# Patient Record
Sex: FEMALE | Race: WHITE | NOT HISPANIC OR LATINO | Employment: FULL TIME | URBAN - METROPOLITAN AREA
[De-identification: names, ages, dates, MRNs, and addresses within clinical notes are randomized per-mention and may not be internally consistent; named-entity substitution may affect disease eponyms.]

---
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
## 2024-01-18 ENCOUNTER — TELEPHONE (OUTPATIENT)
Dept: URGENT CARE | Facility: CLINIC | Age: 19
End: 2024-01-18

## 2024-01-18 ENCOUNTER — OFFICE VISIT (OUTPATIENT)
Dept: URGENT CARE | Facility: CLINIC | Age: 19
End: 2024-01-18
Payer: COMMERCIAL

## 2024-01-18 VITALS
SYSTOLIC BLOOD PRESSURE: 105 MMHG | OXYGEN SATURATION: 98 % | DIASTOLIC BLOOD PRESSURE: 71 MMHG | HEART RATE: 89 BPM | WEIGHT: 140.75 LBS | TEMPERATURE: 99 F | BODY MASS INDEX: 24.94 KG/M2 | RESPIRATION RATE: 18 BRPM | HEIGHT: 63 IN

## 2024-01-18 DIAGNOSIS — Z20.2 ENCOUNTER FOR ASSESSMENT OF STD EXPOSURE: Primary | ICD-10-CM

## 2024-01-18 PROCEDURE — 87491 CHLMYD TRACH DNA AMP PROBE: CPT | Performed by: NURSE PRACTITIONER

## 2024-01-18 PROCEDURE — 99213 OFFICE O/P EST LOW 20 MIN: CPT | Mod: S$GLB,,, | Performed by: NURSE PRACTITIONER

## 2024-01-18 NOTE — PROGRESS NOTES
"Subjective:      Patient ID: Swathi Rachel is a 18 y.o. female.    Vitals:  height is 5' 3" (1.6 m) and weight is 63.9 kg (140 lb 12.2 oz). Her oral temperature is 99 °F (37.2 °C). Her blood pressure is 105/71 and her pulse is 89. Her respiration is 18 and oxygen saturation is 98%.     Chief Complaint: Exposure to STD    Pt presents she would like to retested for chlamydia. No symptoms.     19 yo woman, treated for CT in 11/30/24.  She finished Doxycycline.  Denies any UPI since she was last tested.  Denies any vaginal or urinary symptoms.     Exposure to STD   This is a new problem. The current episode started today. Risk factors include history of STDs.     ROS   Objective:     Physical Exam   Constitutional: She is oriented to person, place, and time. She appears well-developed.   HENT:   Head: Normocephalic and atraumatic. Head is without abrasion, without contusion and without laceration.   Ears:   Right Ear: External ear normal.   Left Ear: External ear normal.   Nose: Nose normal.   Mouth/Throat: Oropharynx is clear and moist and mucous membranes are normal.   Eyes: Conjunctivae, EOM and lids are normal. Pupils are equal, round, and reactive to light.   Neck: Trachea normal and phonation normal. Neck supple.   Cardiovascular: Normal rate, regular rhythm and normal heart sounds.   Pulmonary/Chest: Effort normal and breath sounds normal. No stridor. No respiratory distress.   Musculoskeletal: Normal range of motion.         General: Normal range of motion.   Neurological: She is alert and oriented to person, place, and time.   Skin: Skin is warm, dry, intact and no rash. No abrasion, No burn, No bruising and No ecchymosis   Psychiatric: Her speech is normal and behavior is normal. Judgment and thought content normal.   Nursing note and vitals reviewed.      Assessment:     1. Encounter for assessment of STD exposure        Plan:   Follow labs.  Will call patient when results are finalized.  Strict condom use " advised.  Recommend smoking cessation.  Return to clinica as needed.   Encounter for assessment of STD exposure  -     C. trachomatis/N. gonorrhoeae by AMP DNA [facility]; Vagina

## 2024-01-18 NOTE — PATIENT INSTRUCTIONS
STD Screening     You were tested for sexual transmitted diseases today, we will call you in 3-7 days with the results of the testing. If you need more medication when the labs result come in, we will call you and phone them in for you.  PLEASE SET UP YOUR [facility] ACCOUNT SO THAT YOU CAN RECEIVE YOUR LAB RESULTS ONCE THEY RETURN.  Strict condom use advised.  Please remain abstinent until further notice.         IF POSITIVE:    Notify sexual partners of the need for testing.    NO sexual intercourse until given all lab results and appropriate treatment. Avoid sexual intercourse for 7 days until you and your partner have been treated.     Complete ALL medications prescribed (if required).  Please supplement with OTC probiotics and yogurt if prescribed antibiotics. Take probiotics or eat yogurt 4 hours after you take antibiotics.    Re-test to ensure infection has cleared-there are infections that require more agressive treatment.  Retest for all in 6 weeks (if still have symptoms) and again in 3-6 months to ensure true negative results.  You may be instructed to re-test for cure sooner than 6 weeks.     Today's testing will give no crediable information if you have unprotected sexual activities going forward.      Today's testing will give no crediable information if you have unprotected sexual activities going forward.      REMEMBER WEAR CONDOMS AND GET TESTED OFTEN.             Discussed diagnosis with patient as well as treatment and home care. Discussed return to clinic precautions vs ER precautions. All patients questions answered. Patient verbalized understanding. Patient agreed with plan of care.     You must understand that you have received an Urgent Care treatment only and that you may be released before all of your medical problems are known or treated.

## 2024-01-21 LAB
C TRACH DNA SPEC QL NAA+PROBE: NOT DETECTED
N GONORRHOEA DNA SPEC QL NAA+PROBE: NOT DETECTED

## 2024-01-22 ENCOUNTER — TELEPHONE (OUTPATIENT)
Dept: URGENT CARE | Facility: CLINIC | Age: 19
End: 2024-01-22
Payer: COMMERCIAL

## 2024-03-02 ENCOUNTER — OFFICE VISIT (OUTPATIENT)
Dept: URGENT CARE | Facility: CLINIC | Age: 19
End: 2024-03-02
Payer: COMMERCIAL

## 2024-03-02 VITALS
DIASTOLIC BLOOD PRESSURE: 60 MMHG | BODY MASS INDEX: 23.56 KG/M2 | RESPIRATION RATE: 16 BRPM | WEIGHT: 138 LBS | SYSTOLIC BLOOD PRESSURE: 100 MMHG | HEART RATE: 89 BPM | HEIGHT: 64 IN | TEMPERATURE: 98 F | OXYGEN SATURATION: 100 %

## 2024-03-02 DIAGNOSIS — R05.9 COUGH, UNSPECIFIED TYPE: Primary | ICD-10-CM

## 2024-03-02 DIAGNOSIS — J02.0 STREP PHARYNGITIS: ICD-10-CM

## 2024-03-02 DIAGNOSIS — R09.81 SINUS CONGESTION: ICD-10-CM

## 2024-03-02 DIAGNOSIS — J02.9 SORE THROAT: ICD-10-CM

## 2024-03-02 LAB
CTP QC/QA: YES
MOLECULAR STREP A: POSITIVE
POC MOLECULAR INFLUENZA A AGN: NEGATIVE
POC MOLECULAR INFLUENZA B AGN: NEGATIVE
SARS-COV-2 RDRP RESP QL NAA+PROBE: NEGATIVE

## 2024-03-02 PROCEDURE — 87651 STREP A DNA AMP PROBE: CPT | Mod: QW,S$GLB,, | Performed by: FAMILY MEDICINE

## 2024-03-02 PROCEDURE — 87635 SARS-COV-2 COVID-19 AMP PRB: CPT | Mod: QW,S$GLB,, | Performed by: FAMILY MEDICINE

## 2024-03-02 PROCEDURE — 87502 INFLUENZA DNA AMP PROBE: CPT | Mod: QW,S$GLB,, | Performed by: FAMILY MEDICINE

## 2024-03-02 PROCEDURE — 99214 OFFICE O/P EST MOD 30 MIN: CPT | Mod: S$GLB,,, | Performed by: FAMILY MEDICINE

## 2024-03-02 RX ORDER — PENICILLIN V POTASSIUM 500 MG/1
500 TABLET, FILM COATED ORAL 2 TIMES DAILY
Qty: 20 TABLET | Refills: 0 | Status: SHIPPED | OUTPATIENT
Start: 2024-03-02 | End: 2024-03-05 | Stop reason: ALTCHOICE

## 2024-03-02 RX ORDER — PENICILLIN V POTASSIUM 500 MG/1
500 TABLET, FILM COATED ORAL 2 TIMES DAILY
Qty: 20 TABLET | Refills: 0 | Status: SHIPPED | OUTPATIENT
Start: 2024-03-02 | End: 2024-03-02

## 2024-03-02 NOTE — PROGRESS NOTES
"Subjective:      Patient ID: Swathi Rachel is a 18 y.o. female.    Vitals:  height is 5' 4" (1.626 m) and weight is 62.6 kg (138 lb). Her oral temperature is 97.5 °F (36.4 °C). Her blood pressure is 100/60 and her pulse is 89. Her respiration is 16 and oxygen saturation is 100%.     Chief Complaint: Sinus Problem    Patient presents with c/o cough, sore throat, running nose, sinus congestion. body aches and subjective fever for 3 days. Pt denies CP, SOB, weakness/dizziness, N/V, diarrhea, abdominal pain, dysuria, loss of smell or taste.              Sinus Problem  This is a new problem. Episode onset: 3 days. The problem is unchanged. There has been no fever. Associated symptoms include congestion, coughing and a sore throat. Treatments tried: sudafed.       HENT:  Positive for congestion and sore throat.    Respiratory:  Positive for cough.       Objective:     Physical Exam   Constitutional: She is oriented to person, place, and time. She appears well-developed. She is cooperative.  Non-toxic appearance. She does not appear ill. No distress.   HENT:   Head: Normocephalic and atraumatic.   Ears:   Right Ear: Hearing, tympanic membrane, external ear and ear canal normal. impacted cerumen  Left Ear: Hearing, tympanic membrane, external ear and ear canal normal. impacted cerumen  Nose: Congestion present. No mucosal edema, rhinorrhea or nasal deformity. No epistaxis. Right sinus exhibits no maxillary sinus tenderness and no frontal sinus tenderness. Left sinus exhibits no maxillary sinus tenderness and no frontal sinus tenderness.   Mouth/Throat: Uvula is midline, oropharynx is clear and moist and mucous membranes are normal. No trismus in the jaw. Normal dentition. No uvula swelling. No oropharyngeal exudate or posterior oropharyngeal edema.      Comments: +ve pharyngeal erythema with mild bilateral tonsillar swelling without exudates.        Eyes: Conjunctivae and lids are normal. No scleral icterus.   Neck: " Trachea normal and phonation normal. Neck supple. No edema present. No erythema present. No neck rigidity present.   Cardiovascular: Normal rate, regular rhythm, normal heart sounds and normal pulses.   No murmur heard.  Pulmonary/Chest: Effort normal and breath sounds normal. No stridor. No respiratory distress. She has no decreased breath sounds. She has no wheezes. She has no rhonchi. She has no rales.   Abdominal: Normal appearance. She exhibits no distension. There is no abdominal tenderness. There is no left CVA tenderness and no right CVA tenderness.   Musculoskeletal: Normal range of motion.         General: No deformity. Normal range of motion.      Cervical back: She exhibits no tenderness.   Lymphadenopathy:     She has no cervical adenopathy.   Neurological: She is alert, oriented to person, place, and time and at baseline. She exhibits normal muscle tone. Coordination normal.   Skin: Skin is warm, dry, intact, not diaphoretic and not pale.   Psychiatric: Her speech is normal and behavior is normal. Judgment and thought content normal.   Nursing note and vitals reviewed.      Assessment:     1. Cough, unspecified type    2. Sinus congestion    3. Sore throat    4. Strep pharyngitis        Plan:   Discussed exam findings/results/diagnosis/plan with patient in clinic.  Throat precautions advised.  Advised to f/u with PCP within 2-5 days. ER precautions given if symptoms get any worse. All questions answered. Patient verbally understood and agreed with treatment plan.     Cough, unspecified type  -     POCT COVID-19 Rapid Screening    Sinus congestion  -     POCT Influenza A/B MOLECULAR    Sore throat  -     POCT Strep A, Molecular    Strep pharyngitis    Other orders  -     Discontinue: penicillin v potassium (VEETID) 500 MG tablet; Take 1 tablet (500 mg total) by mouth 2 (two) times a day. for 10 days  Dispense: 20 tablet; Refill: 0  -     penicillin v potassium (VEETID) 500 MG tablet; Take 1 tablet (500  mg total) by mouth 2 (two) times a day. for 10 days  Dispense: 20 tablet; Refill: 0

## 2024-03-05 ENCOUNTER — OFFICE VISIT (OUTPATIENT)
Dept: URGENT CARE | Facility: CLINIC | Age: 19
End: 2024-03-05
Payer: COMMERCIAL

## 2024-03-05 VITALS
WEIGHT: 141.19 LBS | HEIGHT: 64 IN | RESPIRATION RATE: 18 BRPM | HEART RATE: 78 BPM | DIASTOLIC BLOOD PRESSURE: 64 MMHG | OXYGEN SATURATION: 98 % | TEMPERATURE: 99 F | BODY MASS INDEX: 24.1 KG/M2 | SYSTOLIC BLOOD PRESSURE: 106 MMHG

## 2024-03-05 DIAGNOSIS — N89.8 VAGINAL ITCHING: ICD-10-CM

## 2024-03-05 DIAGNOSIS — N76.0 BACTERIAL VAGINOSIS: ICD-10-CM

## 2024-03-05 DIAGNOSIS — N89.8 VAGINAL DISCHARGE: Primary | ICD-10-CM

## 2024-03-05 DIAGNOSIS — Z11.3 SCREENING FOR STD (SEXUALLY TRANSMITTED DISEASE): ICD-10-CM

## 2024-03-05 DIAGNOSIS — B96.89 BACTERIAL VAGINOSIS: ICD-10-CM

## 2024-03-05 PROCEDURE — 99214 OFFICE O/P EST MOD 30 MIN: CPT | Mod: S$GLB,,, | Performed by: NURSE PRACTITIONER

## 2024-03-05 PROCEDURE — 87491 CHLMYD TRACH DNA AMP PROBE: CPT | Performed by: NURSE PRACTITIONER

## 2024-03-05 PROCEDURE — 81514 NFCT DS BV&VAGINITIS DNA ALG: CPT | Performed by: NURSE PRACTITIONER

## 2024-03-05 RX ORDER — FLUCONAZOLE 150 MG/1
150 TABLET ORAL
Qty: 3 TABLET | Refills: 0 | Status: SHIPPED | OUTPATIENT
Start: 2024-03-05 | End: 2024-03-14

## 2024-03-05 RX ORDER — METRONIDAZOLE 500 MG/1
500 TABLET ORAL EVERY 12 HOURS
Qty: 14 TABLET | Refills: 0 | Status: SHIPPED | OUTPATIENT
Start: 2024-03-05 | End: 2024-03-12

## 2024-03-05 NOTE — PROGRESS NOTES
"Subjective:      Patient ID: Swathi Rachel is a 18 y.o. female.    Vitals:  height is 5' 4" (1.626 m) and weight is 64 kg (141 lb 3.3 oz). Her temperature is 99 °F (37.2 °C). Her blood pressure is 106/64 and her pulse is 78. Her respiration is 18 and oxygen saturation is 98%.     Chief Complaint: Vaginal Discharge    Pt presents a vaginal itching, discharge and slight odor that started about 4 days ago. Pt states she would like vaginal testing.    17 yo woman, presents for STD testing.  Reports vaginal discharge, odor and itching x 4 days.  She tested positive for CT in 11/23; checked again in January, this was negative.  Denies UPI in the last 3 months.  Denies any fevers, chills, irregular bleeding, abdominal pain  or urinary issues.  She is sexually active with men.  She is using a Liletta  IUD for birth control.      Vaginal Discharge  The patient's primary symptoms include genital itching, a genital odor and vaginal discharge. This is a new problem. The current episode started in the past 7 days. The problem occurs constantly. The problem has been unchanged. The pain is mild. The vaginal discharge was thick and white. There has been no bleeding. She has not been passing clots. She has not been passing tissue. She has tried nothing for the symptoms. She is sexually active. No, her partner does not have an STD. She uses an IUD for contraception. Her menstrual history has been irregular. Her past medical history is significant for an STD and vaginosis.       Genitourinary:  Positive for vaginal discharge.      Objective:     Physical Exam   Constitutional: She is oriented to person, place, and time. She appears well-developed.   HENT:   Head: Normocephalic and atraumatic.   Ears:   Right Ear: External ear normal.   Left Ear: External ear normal.   Nose: Nose normal. No nasal deformity. No epistaxis.   Mouth/Throat: Oropharynx is clear and moist and mucous membranes are normal.   Eyes: Lids are normal.   Neck: " Trachea normal and phonation normal. Neck supple.   Cardiovascular: Normal pulses.   Pulmonary/Chest: Effort normal.   Abdominal: Normal appearance and bowel sounds are normal. She exhibits no distension. Soft. There is no abdominal tenderness. There is no rebound, no guarding, no left CVA tenderness and no right CVA tenderness.   Genitourinary:    Vulva and right adnexa normal.   Rectum:      No external hemorrhoid.   No vaginal rash and no pubic lice.    Pelvic exam was performed with patient in the lithotomy position.   There is no rash, tenderness, lesion or injury on the right labia. There is no rash, tenderness, lesion or injury on the left labia. Cervix exhibits no motion tenderness, no lesion, no discharge and no friability.    Vaginal discharge (milky white/yellow discharge in the vault) and tenderness (in the vault) present.      No vaginal erythema or bleeding.   There is tenderness (in the vault) in the vagina. No erythema or bleeding in the vagina.    No foreign body in the vagina.      No signs of injury or lesions in the vagina.   Urethra has/is not prolapse, no urethral pain, no urethral swelling and no urethral lesion.   Lymphadenopathy: No inguinal adenopathy noted on the right or left side.   Neurological: She is alert and oriented to person, place, and time.   Skin: Skin is warm, dry and intact.   Psychiatric: Her speech is normal and behavior is normal.   Nursing note and vitals reviewed.      Assessment:     1. Vaginal discharge    2. Vaginal itching    3. Screening for STD (sexually transmitted disease)    4. Bacterial vaginosis        Plan:   Start presumptive treatment for Flagyl and Diflucan.  Follow labs will contact patient with lab results in 3-5 days.  Strict condom use advised.  Return to clinic as needed.     Vaginal discharge  -     C. trachomatis/N. gonorrhoeae by AMP DNA [facility]; Vagina  -     Vaginosis Screen by DNA Probe  -     metroNIDAZOLE (FLAGYL) 500 MG tablet; Take 1 tablet  (500 mg total) by mouth every 12 (twelve) hours. for 7 days  Dispense: 14 tablet; Refill: 0  -     fluconazole (DIFLUCAN) 150 MG Tab; Take 1 tablet (150 mg total) by mouth Every 3 (three) days. for 9 days  Dispense: 3 tablet; Refill: 0    Vaginal itching  -     C. trachomatis/N. gonorrhoeae by AMP [facility]; Vagina  -     Vaginosis Screen by DNA Probe  -     metroNIDAZOLE (FLAGYL) 500 MG tablet; Take 1 tablet (500 mg total) by mouth every 12 (twelve) hours. for 7 days  Dispense: 14 tablet; Refill: 0  -     fluconazole (DIFLUCAN) 150 MG Tab; Take 1 tablet (150 mg total) by mouth Every 3 (three) days. for 9 days  Dispense: 3 tablet; Refill: 0    Screening for STD (sexually transmitted disease)  -     C. trachomatis/N. gonorrhoeae by AMP [facility]; Vagina  -     Vaginosis Screen by DNA Probe    Bacterial vaginosis  -     metroNIDAZOLE (FLAGYL) 500 MG tablet; Take 1 tablet (500 mg total) by mouth every 12 (twelve) hours. for 7 days  Dispense: 14 tablet; Refill: 0

## 2024-03-05 NOTE — PATIENT INSTRUCTIONS
Start Metronidazole twice a day with food today.  Start Diflucan as needed for itching.  We will call you with your test results in 3-5 days.  Return to clinic as needed.     Bacterial Vaginosis    Start taking Flagyl (Metronidazole) today.  Finish the prescription unless instructed otherwise.  Take Flagyl with apple juice to prevent a metallic taste in your mouth and take it with food.    A culture of your vaginal elel was sent to the lab. You will be contacted once it results in about 3-5 days and appropriate action will be taken.        To prevent Bacterial Vaginosis infections:  - Wear breathable cotton underwear  - Keep external genitalia clean and dry  - Change out of wet bathing suits/athletic clothing as soon as possible  - Do not use douches, perfumed soap, creams, bubble baths or perfumed feminine hygiene products  - Use Dove Sensitive Skin soap or just warm water without any soap to wash your vaginal area  - Use use unscented detergents and dryer sheets to wash your clothes  - Use unscented tampons and pads during your menstrual cycles  - Avoid sexual activity while symptomatic  - Try taking an over the counter probiotic daily or eating yogurt.     Get tested for Sexually Transmitted Infections if you have had unprotected sex.    Please return or see your primary care doctor if you develop new or worsening symptoms and have not had the screening.        Please arrange follow up with your primary medical clinic as soon as possible. You must understand that you've received an Urgent Care treatment only and that you may be released before all of your medical problems are known or treated. You, the patient, will arrange for follow up as instructed. If your symptoms worsen or fail to improve you should go to the Emergency Room.    STD Screening     You were tested for sexual transmitted diseases today, we will call you in 3-7 days with the results of the testing. If you need more medication when the labs  result come in, we will call you and phone them in for you.  PLEASE SET UP YOUR [employer] ACCOUNT SO THAT YOU CAN RECEIVE YOUR LAB RESULTS ONCE THEY RETURN.  Strict condom use advised.  Please remain abstinent until further notice.         IF POSITIVE:    Notify sexual partners of the need for testing.    NO sexual intercourse until given all lab results and appropriate treatment. Avoid sexual intercourse for 7 days until you and your partner have been treated.     Complete ALL medications prescribed (if required).  Please supplement with OTC probiotics and yogurt if prescribed antibiotics. Take probiotics or eat yogurt 4 hours after you take antibiotics.    Re-test to ensure infection has cleared-there are infections that require more agressive treatment.  Retest for all in 6 weeks (if still have symptoms) and again in 3-6 months to ensure true negative results.  You may be instructed to re-test for cure sooner than 6 weeks.     Today's testing will give no crediable information if you have unprotected sexual activities going forward.      Today's testing will give no crediable information if you have unprotected sexual activities going forward.      REMEMBER WEAR CONDOMS AND GET TESTED OFTEN.             Discussed diagnosis with patient as well as treatment and home care. Discussed return to clinic precautions vs ER precautions. All patients questions answered. Patient verbalized understanding. Patient agreed with plan of care.     You must understand that you have received an Urgent Care treatment only and that you may be released before all of your medical problems are known or treated.

## 2024-03-06 DIAGNOSIS — B37.9 YEAST INFECTION: Primary | ICD-10-CM

## 2024-03-06 LAB
BACTERIAL VAGINOSIS DNA: NEGATIVE
C TRACH DNA SPEC QL NAA+PROBE: NOT DETECTED
CANDIDA GLABRATA DNA: NEGATIVE
CANDIDA KRUSEI DNA: NEGATIVE
CANDIDA RRNA VAG QL PROBE: POSITIVE
N GONORRHOEA DNA SPEC QL NAA+PROBE: NOT DETECTED
T VAGINALIS RRNA GENITAL QL PROBE: NEGATIVE

## 2024-03-06 RX ORDER — FLUCONAZOLE 150 MG/1
150 TABLET ORAL DAILY
Qty: 1 TABLET | Refills: 0 | Status: SHIPPED | OUTPATIENT
Start: 2024-03-06 | End: 2024-03-07

## 2024-03-07 ENCOUNTER — TELEPHONE (OUTPATIENT)
Dept: URGENT CARE | Facility: CLINIC | Age: 19
End: 2024-03-07
Payer: COMMERCIAL

## 2024-03-07 NOTE — TELEPHONE ENCOUNTER
Called patient regarding lab results.  She is negative for GC/CT.  Vaginosis screen positive for yeast.  Stop Flagyl.  Start Diflucan.  This was already written and another Rx was written yesterday.  Take every 3 days x 1-2 times.  Return to clinic as needed.  Strict condom use advised.

## 2024-04-09 ENCOUNTER — OFFICE VISIT (OUTPATIENT)
Dept: URGENT CARE | Facility: CLINIC | Age: 19
End: 2024-04-09
Payer: COMMERCIAL

## 2024-04-09 VITALS
OXYGEN SATURATION: 98 % | DIASTOLIC BLOOD PRESSURE: 62 MMHG | BODY MASS INDEX: 24.56 KG/M2 | WEIGHT: 143.88 LBS | SYSTOLIC BLOOD PRESSURE: 101 MMHG | HEIGHT: 64 IN | RESPIRATION RATE: 18 BRPM | HEART RATE: 71 BPM | TEMPERATURE: 98 F

## 2024-04-09 DIAGNOSIS — Z11.3 SCREENING EXAMINATION FOR VENEREAL DISEASE: Primary | ICD-10-CM

## 2024-04-09 DIAGNOSIS — N89.8 VAGINAL ODOR: ICD-10-CM

## 2024-04-09 PROCEDURE — 87491 CHLMYD TRACH DNA AMP PROBE: CPT | Performed by: NURSE PRACTITIONER

## 2024-04-09 PROCEDURE — 81514 NFCT DS BV&VAGINITIS DNA ALG: CPT | Performed by: NURSE PRACTITIONER

## 2024-04-09 PROCEDURE — 99213 OFFICE O/P EST LOW 20 MIN: CPT | Mod: S$GLB,,, | Performed by: NURSE PRACTITIONER

## 2024-04-09 NOTE — PROGRESS NOTES
"Subjective:      Patient ID: Swathi Rachel is a 18 y.o. female.    Vitals:  height is 5' 4" (1.626 m) and weight is 65.2 kg (143 lb 13.6 oz). Her oral temperature is 98.1 °F (36.7 °C). Her blood pressure is 101/62 and her pulse is 71. Her respiration is 18 and oxygen saturation is 98%.     Chief Complaint: Vaginal Discharge    Pt presents vaginal discharge and odor that started 4 days ago. Last intercourse was 3 weeks ago.    Vaginal Discharge  The patient's primary symptoms include a genital odor and vaginal discharge. This is a new problem. The current episode started in the past 7 days. The problem occurs constantly. The problem has been unchanged. The patient is experiencing no pain. She is not pregnant. The vaginal discharge was thick and thin (light brown/pink). She has not been passing clots. She has not been passing tissue. She is sexually active. No, her partner does not have an STD. She uses an IUD for contraception. Her menstrual history has been irregular. Her past medical history is significant for an STD and vaginosis.       Genitourinary:  Positive for vaginal discharge.      Objective:     Physical Exam   Constitutional: She is oriented to person, place, and time.  Non-toxic appearance. She does not appear ill. No distress.   HENT:   Head: Normocephalic and atraumatic.   Nose: No congestion.   Eyes: Conjunctivae are normal. Pupils are equal, round, and reactive to light. Extraocular movement intact   Cardiovascular: Normal rate, regular rhythm, normal heart sounds and normal pulses.   Pulmonary/Chest: Effort normal and breath sounds normal. No respiratory distress. She has no wheezes.   Abdominal: Normal appearance. There is no abdominal tenderness.   Musculoskeletal:      Right lower leg: No edema.      Left lower leg: No edema.   Neurological: no focal deficit. She is alert and oriented to person, place, and time.   Skin: Skin is not diaphoretic.   Psychiatric: Her behavior is normal. Mood normal. "   Nursing note and vitals reviewed.    Pt refused vaginal exam today.  Assessment:     1. Screening examination for venereal disease    2. Vaginal odor        Plan:       Screening examination for venereal disease  -     C. trachomatis/N. gonorrhoeae by AMP [facility]; Vagina    Vaginal odor  -     C. trachomatis/N. gonorrhoeae by AMP [facility]; Vagina  -     VAGINOSIS SCREEN BY DNA PROBE      Pt wants to wait for results before treatment.  Will call her when the results are available.

## 2024-04-10 LAB
BACTERIAL VAGINOSIS DNA: POSITIVE
CANDIDA GLABRATA DNA: NEGATIVE
CANDIDA KRUSEI DNA: NEGATIVE
CANDIDA RRNA VAG QL PROBE: NEGATIVE
T VAGINALIS RRNA GENITAL QL PROBE: NEGATIVE

## 2024-04-11 ENCOUNTER — TELEPHONE (OUTPATIENT)
Dept: URGENT CARE | Facility: CLINIC | Age: 19
End: 2024-04-11
Payer: COMMERCIAL

## 2024-04-11 LAB
C TRACH DNA SPEC QL NAA+PROBE: NOT DETECTED
N GONORRHOEA DNA SPEC QL NAA+PROBE: NOT DETECTED

## 2024-04-11 NOTE — TELEPHONE ENCOUNTER
Informed her that her BV is positive but we are still waiting on her GC/CT.  She has a full bottle of Metronidazole at home and will start taking that today.  I will call her back when the rest of her results are available.  Pt verbalized understanding.

## 2024-08-26 ENCOUNTER — OFFICE VISIT (OUTPATIENT)
Dept: URGENT CARE | Facility: CLINIC | Age: 19
End: 2024-08-26
Payer: COMMERCIAL

## 2024-08-26 VITALS
SYSTOLIC BLOOD PRESSURE: 101 MMHG | DIASTOLIC BLOOD PRESSURE: 63 MMHG | TEMPERATURE: 99 F | HEIGHT: 64 IN | OXYGEN SATURATION: 99 % | WEIGHT: 138.69 LBS | RESPIRATION RATE: 17 BRPM | BODY MASS INDEX: 23.68 KG/M2 | HEART RATE: 66 BPM

## 2024-08-26 DIAGNOSIS — N76.0 BV (BACTERIAL VAGINOSIS): ICD-10-CM

## 2024-08-26 DIAGNOSIS — N89.8 VAGINAL DISCHARGE: Primary | ICD-10-CM

## 2024-08-26 DIAGNOSIS — B96.89 BV (BACTERIAL VAGINOSIS): ICD-10-CM

## 2024-08-26 DIAGNOSIS — B37.9 YEAST INFECTION: ICD-10-CM

## 2024-08-26 DIAGNOSIS — Z11.3 SCREENING FOR STD (SEXUALLY TRANSMITTED DISEASE): ICD-10-CM

## 2024-08-26 PROCEDURE — 81514 NFCT DS BV&VAGINITIS DNA ALG: CPT | Performed by: INTERNAL MEDICINE

## 2024-08-26 PROCEDURE — 87491 CHLMYD TRACH DNA AMP PROBE: CPT | Performed by: INTERNAL MEDICINE

## 2024-08-26 PROCEDURE — 87591 N.GONORRHOEAE DNA AMP PROB: CPT | Performed by: INTERNAL MEDICINE

## 2024-08-26 PROCEDURE — 99214 OFFICE O/P EST MOD 30 MIN: CPT | Mod: S$GLB,,, | Performed by: INTERNAL MEDICINE

## 2024-08-27 LAB
BACTERIAL VAGINOSIS DNA: POSITIVE
C TRACH DNA SPEC QL NAA+PROBE: NOT DETECTED
CANDIDA GLABRATA DNA: NEGATIVE
CANDIDA KRUSEI DNA: NEGATIVE
CANDIDA RRNA VAG QL PROBE: POSITIVE
N GONORRHOEA DNA SPEC QL NAA+PROBE: NOT DETECTED
T VAGINALIS RRNA GENITAL QL PROBE: NEGATIVE

## 2024-08-28 VITALS
DIASTOLIC BLOOD PRESSURE: 63 MMHG | RESPIRATION RATE: 16 BRPM | OXYGEN SATURATION: 99 % | HEART RATE: 66 BPM | SYSTOLIC BLOOD PRESSURE: 101 MMHG | TEMPERATURE: 99 F

## 2024-08-28 RX ORDER — METRONIDAZOLE 500 MG/1
500 TABLET ORAL EVERY 12 HOURS
Qty: 14 TABLET | Refills: 0 | Status: SHIPPED | OUTPATIENT
Start: 2024-08-28 | End: 2024-09-04

## 2024-08-28 NOTE — PROGRESS NOTES
Subjective:      Patient ID: Swathi Rachel is a 19 y.o. female.    Vitals:  temperature is 99.2 °F (37.3 °C). Her blood pressure is 101/63 and her pulse is 66. Her respiration is 16 and oxygen saturation is 99%.     Chief Complaint: Vaginal Discharge    20 yo here for vaginal discharge. White, clumpy, odorless.       ROS   Objective:     Physical Exam   Constitutional: She is oriented to person, place, and time.  Non-toxic appearance. She does not appear ill. No distress.   HENT:   Head: Normocephalic and atraumatic.   Ears:   Right Ear: External ear normal.   Left Ear: External ear normal.   Nose: Nose normal. No congestion.   Pulmonary/Chest: No respiratory distress.   Abdominal: Normal appearance.   Neurological: She is alert and oriented to person, place, and time.   Skin: Skin is warm, dry and not diaphoretic.   Psychiatric: Her behavior is normal. Mood, judgment and thought content normal.   Vitals reviewed.      Assessment:     1. Vaginal discharge    2. Yeast infection    3. Screening for STD (sexually transmitted disease)        Plan:       Vaginal discharge    Yeast infection  -     Vaginosis Screen by DNA Probe; Future; Expected date: 08/26/2024    Screening for STD (sexually transmitted disease)  -     Vaginosis Screen by DNA Probe; Future; Expected date: 08/26/2024  -     C. trachomatis/N. gonorrhoeae by AMP [facility]; Vagina  -     Cancel: HIV 1/2 Ag/Ab (4th Gen)  -     Cancel: Treponema Pallidium Antibodies IgG, IgM  -     Cancel: HEPATITIS C ANTIBODY      Fluconazole sent in via rx pad

## 2024-10-21 ENCOUNTER — OFFICE VISIT (OUTPATIENT)
Dept: URGENT CARE | Facility: CLINIC | Age: 19
End: 2024-10-21
Payer: COMMERCIAL

## 2024-10-21 VITALS
OXYGEN SATURATION: 97 % | HEART RATE: 66 BPM | RESPIRATION RATE: 18 BRPM | SYSTOLIC BLOOD PRESSURE: 98 MMHG | BODY MASS INDEX: 24.57 KG/M2 | WEIGHT: 143.94 LBS | DIASTOLIC BLOOD PRESSURE: 60 MMHG | HEIGHT: 64 IN | TEMPERATURE: 98 F

## 2024-10-21 DIAGNOSIS — N89.8 VAGINAL DISCHARGE: Primary | ICD-10-CM

## 2024-10-21 DIAGNOSIS — N89.8 VAGINAL ODOR: ICD-10-CM

## 2024-10-21 PROCEDURE — 0352U VAGINOSIS SCREEN BY DNA PROBE: CPT | Performed by: NURSE PRACTITIONER

## 2024-10-21 PROCEDURE — 99213 OFFICE O/P EST LOW 20 MIN: CPT | Mod: S$GLB,,, | Performed by: NURSE PRACTITIONER

## 2024-10-21 PROCEDURE — 87491 CHLMYD TRACH DNA AMP PROBE: CPT | Performed by: NURSE PRACTITIONER

## 2024-10-21 NOTE — PATIENT INSTRUCTIONS
STD Screening     IF YOU ARE TESTING TODAY WITH A CONCERN FOR A POTENTIAL EXPOSURE TO AN STD AFTER UNPROTECTED SEXUAL INTERCOURSE, IT IS RECOMMENDED TO TEST TODAY AND AGAIN IN 4-6 WEEKS AFTER THE POTENTIAL EXPOSURE TO ENSURE TRUE NEGATIVE RESULTS.       REMAIN ABSTINENT UNTIL AFTER YOUR TESTS HAVE RESULTED    We will call you in 3-7 days with the results of the testing. If you need more medication when the labs result come in, we will call you and phone them in for you.  PLEASE SET UP YOUR [employer] ACCOUNT SO THAT YOU CAN RECEIVE YOUR LAB RESULTS ONCE THEY RETURN.        IF POSITIVE:     IF YOU ARE POSITIVE FOR AN STD, IT IS RECOMMENDED THAT YOU TEST AGAIN IN 3-6 MONTHS AFTER COMPLETION OF THERAPY TO ENSURE YOU REMAIN NEGATIVE.  CERTAIN POSITIVE TESTS MAY REQUIRE SOONER RE-TESTING AND WE WILL GUIDE YOU IN THESE SITUATIONS.  Notify sexual partners of the need for testing.    NO sexual intercourse until given all lab results and appropriate treatment. Avoid sexual intercourse for 7 days until you and your partner have been treated.  Complete ALL medications prescribed (if required).  Please supplement with OTC probiotics and yogurt if prescribed antibiotics. Take probiotics or eat yogurt 4 hours after you take antibiotics.     Today's testing will give no crediable information if you have unprotected sexual activities going forward.         REMEMBER WEAR CONDOMS AND GET TESTED OFTEN.

## 2024-10-22 ENCOUNTER — TELEPHONE (OUTPATIENT)
Dept: URGENT CARE | Facility: CLINIC | Age: 19
End: 2024-10-22
Payer: COMMERCIAL

## 2024-10-22 ENCOUNTER — PATIENT MESSAGE (OUTPATIENT)
Dept: URGENT CARE | Facility: CLINIC | Age: 19
End: 2024-10-22
Payer: COMMERCIAL

## 2024-10-22 ENCOUNTER — ON-DEMAND VIRTUAL (OUTPATIENT)
Dept: URGENT CARE | Facility: CLINIC | Age: 19
End: 2024-10-22
Payer: COMMERCIAL

## 2024-10-22 DIAGNOSIS — N76.0 BACTERIAL VAGINOSIS: Primary | ICD-10-CM

## 2024-10-22 DIAGNOSIS — B37.9 YEAST INFECTION: ICD-10-CM

## 2024-10-22 DIAGNOSIS — R14.0 ABDOMINAL BLOATING: Primary | ICD-10-CM

## 2024-10-22 DIAGNOSIS — B96.89 BACTERIAL VAGINOSIS: Primary | ICD-10-CM

## 2024-10-22 LAB
BACTERIAL VAGINOSIS DNA: DETECTED
C TRACH DNA SPEC QL NAA+PROBE: NOT DETECTED
CANDIDA GLABRATA/KRUSEI: NOT DETECTED
CANDIDA RRNA VAG QL PROBE: DETECTED
N GONORRHOEA DNA SPEC QL NAA+PROBE: NOT DETECTED
TRICHOMONAS VAGINALIS: NOT DETECTED

## 2024-10-22 PROCEDURE — 99213 OFFICE O/P EST LOW 20 MIN: CPT | Mod: 95,,, | Performed by: NURSE PRACTITIONER

## 2024-10-22 RX ORDER — FLUCONAZOLE 150 MG/1
150 TABLET ORAL DAILY
Qty: 2 TABLET | Refills: 0 | Status: SHIPPED | OUTPATIENT
Start: 2024-10-22 | End: 2024-10-24

## 2024-10-22 RX ORDER — DICYCLOMINE HYDROCHLORIDE 10 MG/1
10 CAPSULE ORAL
Qty: 120 CAPSULE | Refills: 0 | Status: SHIPPED | OUTPATIENT
Start: 2024-10-22 | End: 2024-11-21

## 2024-10-22 RX ORDER — METRONIDAZOLE 500 MG/1
500 TABLET ORAL EVERY 12 HOURS
Qty: 14 TABLET | Refills: 0 | Status: SHIPPED | OUTPATIENT
Start: 2024-10-22 | End: 2024-10-29

## 2024-10-22 RX ORDER — FLUCONAZOLE 150 MG/1
150 TABLET ORAL DAILY
Qty: 2 TABLET | Refills: 0 | Status: SHIPPED | OUTPATIENT
Start: 2024-10-22 | End: 2024-10-22 | Stop reason: SDUPTHER

## 2024-10-22 RX ORDER — METRONIDAZOLE 500 MG/1
500 TABLET ORAL EVERY 12 HOURS
Qty: 14 TABLET | Refills: 0 | Status: SHIPPED | OUTPATIENT
Start: 2024-10-22 | End: 2024-10-22 | Stop reason: SDUPTHER

## 2024-10-22 NOTE — PROGRESS NOTES
Subjective:      Patient ID: Swathi Rachel is a 19 y.o. female.    Vitals:  vitals were not taken for this visit.     Chief Complaint: Bloated      Visit Type: TELE AUDIOVISUAL - This visit was conducted virtually based on  subjective information and limited objective exam    Present with the patient at the time of consultation: TELEMED PRESENT WITH PATIENT: None  Two patient identifiers used to verify patient- saying out date of birth and full name.       Past Medical History:   Diagnosis Date    HSV-1 (herpes simplex virus 1) infection      History reviewed. No pertinent surgical history.  Review of patient's allergies indicates:  No Known Allergies  Current Outpatient Medications on File Prior to Visit   Medication Sig Dispense Refill    fluconazole (DIFLUCAN) 150 MG Tab Take 1 tablet (150 mg total) by mouth once daily. Take 1 dose now and repeat in 3 days. for 2 doses 2 tablet 0    metroNIDAZOLE (FLAGYL) 500 MG tablet Take 1 tablet (500 mg total) by mouth every 12 (twelve) hours. for 7 days 14 tablet 0    valACYclovir (VALTREX) 500 MG tablet Take 500 mg by mouth.      [DISCONTINUED] fluconazole (DIFLUCAN) 150 MG Tab Take 1 tablet (150 mg total) by mouth once daily. Take 1 dose now and repeat in 3 days. for 2 doses 2 tablet 0    [DISCONTINUED] metroNIDAZOLE (FLAGYL) 500 MG tablet Take 1 tablet (500 mg total) by mouth every 12 (twelve) hours. for 7 days 14 tablet 0     No current facility-administered medications on file prior to visit.     Family History   Problem Relation Name Age of Onset    Breast cancer Mother      No Known Problems Father         Medications Ordered                [id number] - [place] - [address]   [address] [place] [address]    Telephone: [phone]   Fax: [phone]   Hours: Not open 24 hours                         E-Prescribed (1 of 1)              dicyclomine (BENTYL) 10 MG capsule    Sig: Take 1 capsule (10 mg total) by mouth 4 (four)  times daily before meals and nightly.       Start: 10/22/24     Quantity: 120 capsule Refills: 0                           Ohs Peq Odvv Intake    10/22/2024  6:30 PM CDT - Filed by Patient   What is your current physical address in the event of a medical emergency? [address] [place] [address]   Are you able to take your vital signs? No   Please attach any relevant images or files    Is your employer contracted with [facility]? No         19 female with c/o bloating, groin pain, constipation and fatigue. She states worsening over the past month. She states no nausea and vomiting. She has tried laxative , tea, fiber, and water. She states she does not get any relief from constipation and indigestion. She states she is sleeping fine. She states she has primary care in home town but here for school she denies fever.         Constitution: Positive for fatigue.   HENT: Negative.     Cardiovascular: Negative.    Respiratory: Negative.     Gastrointestinal:  Positive for abdominal bloating and constipation.   Endocrine: negative.   Genitourinary:  Positive for vaginal pain. Negative for frequency and urgency.   Musculoskeletal: Negative.    Skin: Negative.    Allergic/Immunologic: Negative.    Neurological: Negative.    Hematologic/Lymphatic: Negative.    Psychiatric/Behavioral: Negative.          Objective:   The physical exam was conducted virtually.  LOCATION OF PATIENT [place] x 3 ; no acute distress noted; appearance normal; mood and behavior normal; thought process normal  Head- normocephalic  Nose- appears normal, no discharge or erythema  Eyes- pupils appear normal in size, no drainage, no erythema  Ears- normal appearing; no discharge, no erythema  Mouth- appears normal  Oropharynx- no erythema, lesions  Lungs- breathing at a normal rate, no acute distress noted  Heart- no reports of tachycardia, palpitations, chest pain  Abdomen- non distended, non tender reported by  patient  Skin- warm and dry, no erythema or edema noted by patient or visualized  Psych- as above; no si/hi      Assessment:     1. Abdominal bloating        Plan:     Refer to gastroenterologist    Thank you for choosing [facility]!    Our goal in the [facility] is to always provide outstanding medical care. You may receive a survey by mail or e-mail in the next week regarding your experience today. We would greatly appreciate you completing and returning the survey. Your feedback provides us with a way to recognize our staff who provide very good care, and it helps us learn how to improve when your experience was below our aspiration of excellence.         We appreciate you trusting us with your medical care. We hope you feel better soon. We will be happy to take care of you for all of your future medical needs.    You must understand that you've received an Urgent Care treatment only and that you may be released before all your medical problems are known or treated. You, the patient, will arrange for follow up care as instructed.    Follow up with your PCP or specialty clinic as directed in the next 1-2 weeks if not improved or as needed.  You can call [phone] to schedule an appointment with the appropriate provider.    If your condition worsens we recommend that you receive another evaluation in person, with your primary care provider, urgent care or at the emergency room immediately or contact your primary medical clinics after hours call service to discuss your concerns.         Abdominal bloating  -     dicyclomine (BENTYL) 10 MG capsule; Take 1 capsule (10 mg total) by mouth 4 (four) times daily before meals and nightly.  Dispense: 120 capsule; Refill: 0

## 2024-10-23 ENCOUNTER — TELEPHONE (OUTPATIENT)
Dept: ENDOSCOPY | Facility: HOSPITAL | Age: 19
End: 2024-10-23
Payer: COMMERCIAL

## 2024-10-23 ENCOUNTER — LAB VISIT (OUTPATIENT)
Dept: LAB | Facility: HOSPITAL | Age: 19
End: 2024-10-23
Attending: INTERNAL MEDICINE
Payer: COMMERCIAL

## 2024-10-23 ENCOUNTER — OFFICE VISIT (OUTPATIENT)
Dept: GASTROENTEROLOGY | Facility: CLINIC | Age: 19
End: 2024-10-23
Payer: COMMERCIAL

## 2024-10-23 VITALS
SYSTOLIC BLOOD PRESSURE: 92 MMHG | DIASTOLIC BLOOD PRESSURE: 59 MMHG | BODY MASS INDEX: 23.98 KG/M2 | HEIGHT: 64 IN | HEART RATE: 82 BPM | WEIGHT: 140.44 LBS

## 2024-10-23 DIAGNOSIS — R14.0 BLOATING: ICD-10-CM

## 2024-10-23 DIAGNOSIS — Z12.11 SPECIAL SCREENING FOR MALIGNANT NEOPLASMS, COLON: Primary | ICD-10-CM

## 2024-10-23 DIAGNOSIS — R19.4 CHANGE IN BOWEL HABITS: ICD-10-CM

## 2024-10-23 DIAGNOSIS — R14.0 BLOATING: Primary | ICD-10-CM

## 2024-10-23 LAB
ALBUMIN SERPL BCP-MCNC: 4.3 G/DL (ref 3.5–5.2)
ALP SERPL-CCNC: 71 U/L (ref 40–150)
ALT SERPL W/O P-5'-P-CCNC: 12 U/L (ref 10–44)
ANION GAP SERPL CALC-SCNC: 7 MMOL/L (ref 8–16)
AST SERPL-CCNC: 16 U/L (ref 10–40)
BASOPHILS # BLD AUTO: 0.05 K/UL (ref 0–0.2)
BASOPHILS NFR BLD: 0.8 % (ref 0–1.9)
BILIRUB SERPL-MCNC: 1.1 MG/DL (ref 0.1–1)
BUN SERPL-MCNC: 14 MG/DL (ref 6–20)
CALCIUM SERPL-MCNC: 9.9 MG/DL (ref 8.7–10.5)
CHLORIDE SERPL-SCNC: 108 MMOL/L (ref 95–110)
CO2 SERPL-SCNC: 26 MMOL/L (ref 23–29)
CREAT SERPL-MCNC: 0.9 MG/DL (ref 0.5–1.4)
DIFFERENTIAL METHOD BLD: ABNORMAL
EOSINOPHIL # BLD AUTO: 0.1 K/UL (ref 0–0.5)
EOSINOPHIL NFR BLD: 1.1 % (ref 0–8)
ERYTHROCYTE [DISTWIDTH] IN BLOOD BY AUTOMATED COUNT: 12.6 % (ref 11.5–14.5)
EST. GFR  (NO RACE VARIABLE): >60 ML/MIN/1.73 M^2
FOLATE SERPL-MCNC: 11.8 NG/ML (ref 4–24)
GLUCOSE SERPL-MCNC: 81 MG/DL (ref 70–110)
HCT VFR BLD AUTO: 42.9 % (ref 37–48.5)
HGB BLD-MCNC: 14 G/DL (ref 12–16)
IMM GRANULOCYTES # BLD AUTO: 0.01 K/UL (ref 0–0.04)
IMM GRANULOCYTES NFR BLD AUTO: 0.2 % (ref 0–0.5)
LYMPHOCYTES # BLD AUTO: 1.4 K/UL (ref 1–4.8)
LYMPHOCYTES NFR BLD: 23 % (ref 18–48)
MCH RBC QN AUTO: 30.6 PG (ref 27–31)
MCHC RBC AUTO-ENTMCNC: 32.6 G/DL (ref 32–36)
MCV RBC AUTO: 94 FL (ref 82–98)
MONOCYTES # BLD AUTO: 0.6 K/UL (ref 0.3–1)
MONOCYTES NFR BLD: 9.1 % (ref 4–15)
NEUTROPHILS # BLD AUTO: 4.1 K/UL (ref 1.8–7.7)
NEUTROPHILS NFR BLD: 65.8 % (ref 38–73)
NRBC BLD-RTO: 0 /100 WBC
PLATELET # BLD AUTO: 244 K/UL (ref 150–450)
PMV BLD AUTO: 9 FL (ref 9.2–12.9)
POTASSIUM SERPL-SCNC: 4.3 MMOL/L (ref 3.5–5.1)
PROT SERPL-MCNC: 7.6 G/DL (ref 6–8.4)
RBC # BLD AUTO: 4.57 M/UL (ref 4–5.4)
SODIUM SERPL-SCNC: 141 MMOL/L (ref 136–145)
T3FREE SERPL-MCNC: 2.4 PG/ML (ref 2.3–4.2)
T4 FREE SERPL-MCNC: 1.01 NG/DL (ref 0.71–1.51)
TSH SERPL DL<=0.005 MIU/L-ACNC: 0.78 UIU/ML (ref 0.4–4)
VIT B12 SERPL-MCNC: 906 PG/ML (ref 210–950)
WBC # BLD AUTO: 6.23 K/UL (ref 3.9–12.7)

## 2024-10-23 PROCEDURE — 80053 COMPREHEN METABOLIC PANEL: CPT | Performed by: INTERNAL MEDICINE

## 2024-10-23 PROCEDURE — 84481 FREE ASSAY (FT-3): CPT | Performed by: INTERNAL MEDICINE

## 2024-10-23 PROCEDURE — 1159F MED LIST DOCD IN RCRD: CPT | Mod: CPTII,S$GLB,, | Performed by: INTERNAL MEDICINE

## 2024-10-23 PROCEDURE — 85025 COMPLETE CBC W/AUTO DIFF WBC: CPT | Performed by: INTERNAL MEDICINE

## 2024-10-23 PROCEDURE — 82784 ASSAY IGA/IGD/IGG/IGM EACH: CPT | Performed by: INTERNAL MEDICINE

## 2024-10-23 PROCEDURE — 84443 ASSAY THYROID STIM HORMONE: CPT | Performed by: INTERNAL MEDICINE

## 2024-10-23 PROCEDURE — 99999 PR PBB SHADOW E&M-EST. PATIENT-LVL III: CPT | Mod: PBBFAC,,, | Performed by: INTERNAL MEDICINE

## 2024-10-23 PROCEDURE — 82746 ASSAY OF FOLIC ACID SERUM: CPT | Performed by: INTERNAL MEDICINE

## 2024-10-23 PROCEDURE — 36415 COLL VENOUS BLD VENIPUNCTURE: CPT | Performed by: INTERNAL MEDICINE

## 2024-10-23 PROCEDURE — 99204 OFFICE O/P NEW MOD 45 MIN: CPT | Mod: S$GLB,,, | Performed by: INTERNAL MEDICINE

## 2024-10-23 PROCEDURE — 3008F BODY MASS INDEX DOCD: CPT | Mod: CPTII,S$GLB,, | Performed by: INTERNAL MEDICINE

## 2024-10-23 PROCEDURE — 84439 ASSAY OF FREE THYROXINE: CPT | Performed by: INTERNAL MEDICINE

## 2024-10-23 PROCEDURE — 82607 VITAMIN B-12: CPT | Performed by: INTERNAL MEDICINE

## 2024-10-23 RX ORDER — IBUPROFEN 200 MG
TABLET ORAL
COMMUNITY

## 2024-10-23 NOTE — TELEPHONE ENCOUNTER
"----- Message from Katie sent at 10/23/2024  9:33 AM CDT -----    ----- Message -----  From: Mehdi Eid MD  Sent: 10/23/2024   8:58 AM CDT  To: [facility]    Procedure: EGD/Colon    Diagnosis: bloating, change in bowel habits    Procedure Timin-12 weeks    #If within 4 weeks selected, please scott as high priority#    #If greater than 12 weeks, please select "4-12 weeks" and delay sending until 2 months prior to requested date#     Provider: Myself    Location: No Preference    Additional Scheduling Information: No scheduling concerns    Prep Specifications:N/A    Have you attached a patient to this message: yes  "

## 2024-10-23 NOTE — PROGRESS NOTES
"GENERAL GI PATIENT INTAKE:    COVID symptoms in the last 7 days (runny nose, sore throat, congestion, cough, fever): No  PCP: No, Primary Doctor  If not PCP-  number given to establish [phone]: N/A    ALLERGIES REVIEWED:  Yes    CHIEF COMPLAINT:    Chief Complaint   Patient presents with    Initial Visit    Constipation    Bloated    Fatigue    Gastroesophageal Reflux    Abdominal Pain       VITAL SIGNS:  Ht 5' 4" (1.626 m)   Wt 63.7 kg (140 lb 6.9 oz)   LMP  (LMP Unknown)   BMI 24.11 kg/m²      Change in medical, surgical, family or social history:  reviewed MD      REVIEWED MEDICATION LIST RECONCILED INCLUDING ABOVE MEDS:  Yes     "

## 2024-10-23 NOTE — TELEPHONE ENCOUNTER
"Mehdi Eid MD  [facility]  Procedure: EGD/Colon    Diagnosis: bloating, change in bowel habits    Procedure Timin-12 weeks    *If within 4 weeks selected, please scott as high priority*    *If greater than 12 weeks, please select "4-12 weeks" and delay sending until 2 months prior to requested date*    Provider: Myself    Location: No Preference    Additional Scheduling Information: No scheduling concerns    Prep Specifications:N/A    Have you attached a patient to this message: yes  "

## 2024-10-23 NOTE — PROGRESS NOTES
[facility] Consultation Note    Reason for Consult:  The encounter diagnosis was Bloating.    PCP:   No, Primary Doctor   No address on file    Referring MD:  Self, Aaareferral  No address on file    Initial History of Present Illness (HPI):  This is a 19 y.o. female here for evaluation of constipation, bloating, fatigue    Happens within 15 minutes of eating  Constipation has gotten worse    Diet: Lactose: does eat   Fructose: some broccoli   (apples, cherries, mangoes, watermelon and pears,fruit juices, dried fruits and canned fruits,asparagus, artichoke and sugar snap peas, honey)   Chewing Gum: no   Carbonated beverages: not really    International Travel: no  Celiac: not testing  Antibiotics History: no  Medications (OCP/PPI/Steroids/NSAIDs):  Activity level: very active  Constipation: Yes          ROS:  Constitutional: No fevers, chills, No weight loss, + sluggish  ENT: No allergies  CV: No chest pain  Pulm: No cough, No shortness of breath  Ophtho: No vision changes  GI: see HPI  Derm: No rash  Heme: No lymphadenopathy, No bruising  MSK: No arthritis  : No dysuria, No hematuria  Endo: No hot or cold intolerance  Neuro: No syncope, No seizure  Psych: No anxiety, No depression    Medical History:  has a past medical history of HSV-1 (herpes simplex virus 1) infection.    Surgical History:  has no past surgical history on file.    Family History: family history includes Breast cancer in her mother; No Known Problems in her father..     Social History:  reports that she has been smoking cigarettes and vaping with nicotine. She has never used smokeless tobacco. She reports current alcohol use. She reports that she does not use drugs.    Review of patient's allergies indicates:  No Known Allergies    Medication List with Changes/Refills   Current Medications    DICYCLOMINE (BENTYL) 10 MG CAPSULE    Take 1 capsule (10 mg total) by mouth 4 (four) times daily before meals and nightly.     "FLUCONAZOLE (DIFLUCAN) 150 MG TAB    Take 1 tablet (150 mg total) by mouth once daily. Take 1 dose now and repeat in 3 days. for 2 doses    LACTOBAC NO.41/BIFIDOBACT NO.7 (PROBIOTIC-10 ORAL)    Take by mouth.    METRONIDAZOLE (FLAGYL) 500 MG TABLET    Take 1 tablet (500 mg total) by mouth every 12 (twelve) hours. for 7 days    PROTEIN POWD    Take by mouth.    VALACYCLOVIR (VALTREX) 500 MG TABLET    Take 500 mg by mouth.         Objective Findings:    Vital Signs:  Ht 5' 4" (1.626 m)   Wt 63.7 kg (140 lb 6.9 oz)   LMP  (LMP Unknown)   BMI 24.11 kg/m²   Body mass index is 24.11 kg/m².    Physical Exam:  General Appearance: Well appearing in no acute distress  Head:   Normocephalic, without obvious abnormality  Eyes:    No scleral icterus, EOMI  ENT: Neck supple, Lips, mucosa, and tongue normal; teeth and gums normal  Lungs: CTA bilaterally in anterior and posterior fields, no wheezes, no crackles.  Heart:  Regular rate and rhythm, S1, S2 normal, no murmurs heard  Abdomen: Soft, non tender, non distended with positive bowel sounds in all four quadrants. No hepatosplenomegaly, ascites, or mass  Extremities: 2+ pulses, no clubbing, cyanosis or edema  Skin: No rash  Neurologic: CN II-XII intact      Labs:  No results found for: "WBC", "HGB", "HCT", "PLT", "CHOL", "TRIG", "HDL", "LDLDIRECT", "ALT", "AST", "NA", "K", "CL", "CREATININE", "BUN", "CO2", "TSH", "PSA", "INR", "GLUF", "HGBA1C", "MICROALBUR"    No results found for: "HPYLORINTERP"  No results found for: "HPYLORIANTIG"        Imaging:    Endoscopy:          Assessment:  1. Bloating           Recommendations:  1. Labs and stool samples as below  2. EGD/Colon with SB biopsies and disacch panel  3. SIBO testing if all unrevealing    No follow-ups on file.      Order summary:  Orders Placed This Encounter    Comprehensive Metabolic Panel    CBC Auto Differential    TSH    T3, Free    T4, Free    Celiac Disease Panel    Vitamin B12    Folate    Giardia / " Cryptosporidum, EIA    Pancreatic elastase, fecal         Thank you so much for allowing me to participate in the care of Swathi Eid MD

## 2024-10-23 NOTE — TELEPHONE ENCOUNTER
"----- Message from Katie sent at 10/23/2024  9:33 AM CDT -----    ----- Message -----  From: Mehdi Eid MD  Sent: 10/23/2024   8:58 AM CDT  To: [facility] Patients    Procedure: EGD/Colon    Diagnosis: bloating, change in bowel habits    Procedure Timin-12 weeks    #If within 4 weeks selected, please scott as high priority#    #If greater than 12 weeks, please select "4-12 weeks" and delay sending until 2 months prior to requested date#     Provider: Myself    Location: No Preference    Additional Scheduling Information: No scheduling concerns    Prep Specifications:N/A    Have you attached a patient to this message: yes  "

## 2024-10-23 NOTE — TELEPHONE ENCOUNTER
Referral for procedure from Telephone call - direct access patient      Spoke to patient to schedule procedure(s) Colonoscopy/EGD       Physician to perform procedure(s) Dr. RUDOLPH Nicolas (Dr. Eid with no availability)  Date of Procedure (s) 12/20/24  Arrival Time 9:45 AM  Time of Procedure(s) 10:45 AM   Location of Procedure(s) [facility] [address] [facility]  Type of Rx Prep sent to patient: PEG  Instructions provided to patient via Copy in hand    Patient was informed on the following information and verbalized understanding. Screening questionnaire reviewed with patient and complete. If procedure requires anesthesia, a responsible adult needs to be present to accompany the patient home, patient cannot drive after receiving anesthesia. Appointment details are tentative, especially check-in time. Patient will receive a prep-op call 7 days prior to confirm check-in time for procedure. If applicable the patient should contact their pharmacy to verify Rx for procedure prep is ready for pick-up. Patient was advised to call the scheduling department at [phone] if pharmacy states no Rx is available. Patient was advised to call the endoscopy scheduling department if any questions or concerns arise.      [facility] Scheduling Department

## 2024-10-28 LAB
GLIADIN PEPTIDE IGA SER-ACNC: 1.2 U/ML
GLIADIN PEPTIDE IGG SER-ACNC: 1.4 U/ML
IGA SERPL-MCNC: 266 MG/DL (ref 70–400)
TTG IGA SER-ACNC: 1 U/ML
TTG IGG SER-ACNC: <0.6 U/ML

## 2024-10-29 ENCOUNTER — PATIENT MESSAGE (OUTPATIENT)
Dept: GASTROENTEROLOGY | Facility: CLINIC | Age: 19
End: 2024-10-29
Payer: COMMERCIAL

## 2024-10-30 ENCOUNTER — LAB VISIT (OUTPATIENT)
Dept: LAB | Facility: HOSPITAL | Age: 19
End: 2024-10-30
Attending: INTERNAL MEDICINE
Payer: COMMERCIAL

## 2024-10-30 DIAGNOSIS — R14.0 BLOATING: ICD-10-CM

## 2024-10-30 PROCEDURE — 82653 EL-1 FECAL QUANTITATIVE: CPT | Performed by: INTERNAL MEDICINE

## 2024-11-02 LAB — ELASTASE 1, FECAL: 387 MCG/G

## 2024-11-04 ENCOUNTER — PATIENT MESSAGE (OUTPATIENT)
Dept: GASTROENTEROLOGY | Facility: CLINIC | Age: 19
End: 2024-11-04
Payer: COMMERCIAL

## 2024-11-18 ENCOUNTER — OFFICE VISIT (OUTPATIENT)
Dept: URGENT CARE | Facility: CLINIC | Age: 19
End: 2024-11-18
Payer: COMMERCIAL

## 2024-11-18 VITALS
OXYGEN SATURATION: 98 % | TEMPERATURE: 99 F | RESPIRATION RATE: 19 BRPM | HEIGHT: 64 IN | HEART RATE: 94 BPM | DIASTOLIC BLOOD PRESSURE: 67 MMHG | WEIGHT: 140.88 LBS | SYSTOLIC BLOOD PRESSURE: 100 MMHG | BODY MASS INDEX: 24.05 KG/M2

## 2024-11-18 DIAGNOSIS — N93.9 VAGINAL SPOTTING: Primary | ICD-10-CM

## 2024-11-18 DIAGNOSIS — Z11.3 SCREENING EXAMINATION FOR VENEREAL DISEASE: ICD-10-CM

## 2024-11-18 PROCEDURE — 0352U VAGINOSIS SCREEN BY DNA PROBE: CPT | Performed by: NURSE PRACTITIONER

## 2024-11-18 PROCEDURE — 99213 OFFICE O/P EST LOW 20 MIN: CPT | Mod: S$GLB,,, | Performed by: NURSE PRACTITIONER

## 2024-11-18 PROCEDURE — 87491 CHLMYD TRACH DNA AMP PROBE: CPT | Performed by: NURSE PRACTITIONER

## 2024-11-18 NOTE — PATIENT INSTRUCTIONS
STD Screening     IF YOU ARE TESTING TODAY WITH A CONCERN FOR A POTENTIAL EXPOSURE TO AN STD AFTER UNPROTECTED SEXUAL INTERCOURSE, IT IS RECOMMENDED TO TEST TODAY AND AGAIN IN 4-6 WEEKS AFTER THE POTENTIAL EXPOSURE TO ENSURE TRUE NEGATIVE RESULTS.       REMAIN ABSTINENT UNTIL AFTER YOUR TESTS HAVE RESULTED    We will call you in 3-7 days with the results of the testing. If you need more medication when the labs result come in, we will call you and phone them in for you.  PLEASE SET UP YOUR [employer] ACCOUNT SO THAT YOU CAN RECEIVE YOUR LAB RESULTS ONCE THEY RETURN.        IF POSITIVE:     IF YOU ARE POSITIVE FOR AN STD, IT IS RECOMMENDED THAT YOU TEST AGAIN IN 3-6 MONTHS AFTER COMPLETION OF THERAPY TO ENSURE YOU REMAIN NEGATIVE.  CERTAIN POSITIVE TESTS MAY REQUIRE SOONER RE-TESTING AND WE WILL GUIDE YOU IN THESE SITUATIONS.  Notify sexual partners of the need for testing.    NO sexual intercourse until given all lab results and appropriate treatment. Avoid sexual intercourse for 7 days until you and your partner have been treated.  Complete ALL medications prescribed (if required).  Please supplement with OTC probiotics and yogurt if prescribed antibiotics. Take probiotics or eat yogurt 4 hours after you take antibiotics.     Today's testing will give no crediable information if you have unprotected sexual activities going forward.         REMEMBER WEAR CONDOMS AND GET TESTED OFTEN.

## 2024-11-18 NOTE — PROGRESS NOTES
"Subjective:      Patient ID: Swathi Rachel is a 19 y.o. female.    Vitals:  height is 5' 4" (1.626 m) and weight is 63.9 kg (140 lb 14 oz). Her oral temperature is 99.3 °F (37.4 °C). Her blood pressure is 100/67 and her pulse is 94. Her respiration is 19 and oxygen saturation is 98%.     Chief Complaint: Vaginal Bleeding    This is a 19 y.o. female who presents today with a chief complaint of vaginal bleeding and discharge that started 2 days ago. Last intercourse was 1 week ago.    Vaginal Bleeding  The patient's primary symptoms include a genital odor, vaginal bleeding and vaginal discharge. This is a new problem. The current episode started in the past 7 days. The problem has been unchanged. The patient is experiencing no pain. The vaginal discharge was thick. The vaginal bleeding is lighter than menses. She has tried nothing for the symptoms. She is sexually active. It is unknown whether or not her partner has an STD. She uses an IUD for contraception. Her past medical history is significant for an STD.       Genitourinary:  Positive for vaginal discharge and vaginal bleeding.    Pt states she has an IUD and normally doesn't have cycle.  The last time she was spotting she was positive for chlamydia. She would like to be tested for BV and chlamydia today.    Objective:     Physical Exam   Constitutional: She is oriented to person, place, and time.  Non-toxic appearance. She does not appear ill. No distress.   HENT:   Head: Normocephalic and atraumatic.   Eyes: Conjunctivae are normal. Pupils are equal, round, and reactive to light. Extraocular movement intact   Cardiovascular: Normal rate, regular rhythm, normal heart sounds and normal pulses.   Pulmonary/Chest: Effort normal and breath sounds normal. No respiratory distress. She has no wheezes.   Abdominal: Normal appearance.   Neurological: no focal deficit. She is alert and oriented to person, place, and time.   Skin: Skin is warm, dry and not diaphoretic. "   Psychiatric: Her behavior is normal. Mood normal.   Nursing note and vitals reviewed.    Assessment:     1. Vaginal spotting    2. Screening examination for venereal disease        Plan:       Vaginal spotting  -     C. trachomatis/N. gonorrhoeae by AMP DNA [facility]; Vagina  -     Vaginosis Screen by DNA Probe; Future; Expected date: 11/18/2024    Screening examination for venereal disease  -     C. trachomatis/N. gonorrhoeae by AMP [facility]; Vagina

## 2024-11-19 LAB
BACTERIAL VAGINOSIS DNA: NOT DETECTED
C TRACH DNA SPEC QL NAA+PROBE: NOT DETECTED
CANDIDA GLABRATA/KRUSEI: NOT DETECTED
CANDIDA RRNA VAG QL PROBE: DETECTED
N GONORRHOEA DNA SPEC QL NAA+PROBE: NOT DETECTED
TRICHOMONAS VAGINALIS: NOT DETECTED

## 2024-11-20 ENCOUNTER — TELEPHONE (OUTPATIENT)
Dept: URGENT CARE | Facility: CLINIC | Age: 19
End: 2024-11-20
Payer: COMMERCIAL

## 2024-11-20 DIAGNOSIS — B37.31 VAGINAL YEAST INFECTION: Primary | ICD-10-CM

## 2024-11-20 RX ORDER — FLUCONAZOLE 150 MG/1
150 TABLET ORAL DAILY
Qty: 1 TABLET | Refills: 0 | Status: SHIPPED | OUTPATIENT
Start: 2024-11-20 | End: 2024-11-21

## 2024-11-20 NOTE — TELEPHONE ENCOUNTER
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  Discussed with her that she should not use scented products in the vaginal area.  The only thing scented she uses is her detergent.  Suggested she switch to something with "Free and Clear" such as All or Dreft. To see if that stops the cycle of constant yeast and BV she has had for several months.  Pt will try and switch to see if it helps.   "

## 2025-02-17 ENCOUNTER — OFFICE VISIT (OUTPATIENT)
Dept: URGENT CARE | Facility: CLINIC | Age: 20
End: 2025-02-17
Payer: COMMERCIAL

## 2025-02-17 VITALS
TEMPERATURE: 99 F | SYSTOLIC BLOOD PRESSURE: 107 MMHG | HEIGHT: 64 IN | BODY MASS INDEX: 24.05 KG/M2 | OXYGEN SATURATION: 99 % | WEIGHT: 140.88 LBS | DIASTOLIC BLOOD PRESSURE: 62 MMHG | HEART RATE: 85 BPM | RESPIRATION RATE: 18 BRPM

## 2025-02-17 DIAGNOSIS — Z11.3 SCREENING EXAMINATION FOR VENEREAL DISEASE: ICD-10-CM

## 2025-02-17 DIAGNOSIS — N89.8 VAGINAL ODOR: ICD-10-CM

## 2025-02-17 DIAGNOSIS — N89.8 VAGINAL DISCHARGE: Primary | ICD-10-CM

## 2025-02-17 LAB
HCV AB SERPL QL IA: NORMAL
HIV 1+2 AB+HIV1 P24 AG SERPL QL IA: NORMAL
TREPONEMA PALLIDUM IGG+IGM AB [PRESENCE] IN SERUM OR PLASMA BY IMMUNOASSAY: NONREACTIVE

## 2025-02-17 PROCEDURE — 36415 COLL VENOUS BLD VENIPUNCTURE: CPT | Performed by: NURSE PRACTITIONER

## 2025-02-17 PROCEDURE — 87591 N.GONORRHOEAE DNA AMP PROB: CPT | Performed by: NURSE PRACTITIONER

## 2025-02-17 PROCEDURE — 81515 NFCT DS BV&VAGINITIS DNA ALG: CPT | Performed by: NURSE PRACTITIONER

## 2025-02-17 PROCEDURE — 86803 HEPATITIS C AB TEST: CPT | Performed by: NURSE PRACTITIONER

## 2025-02-17 PROCEDURE — 86593 SYPHILIS TEST NON-TREP QUANT: CPT | Performed by: NURSE PRACTITIONER

## 2025-02-17 PROCEDURE — 87389 HIV-1 AG W/HIV-1&-2 AB AG IA: CPT | Performed by: NURSE PRACTITIONER

## 2025-02-17 RX ORDER — METRONIDAZOLE 500 MG/1
500 TABLET ORAL EVERY 12 HOURS
Qty: 14 TABLET | Refills: 0 | Status: SHIPPED | OUTPATIENT
Start: 2025-02-17 | End: 2025-02-24

## 2025-02-17 NOTE — PATIENT INSTRUCTIONS
STD Screening     IF YOU ARE TESTING TODAY WITH A CONCERN FOR A POTENTIAL EXPOSURE TO AN STD AFTER UNPROTECTED SEXUAL INTERCOURSE, IT IS RECOMMENDED TO TEST TODAY AND AGAIN IN 4-6 WEEKS AFTER THE POTENTIAL EXPOSURE TO ENSURE TRUE NEGATIVE RESULTS.       REMAIN ABSTINENT UNTIL AFTER YOUR TESTS HAVE RESULTED    We will call you in 3-7 days with the results of the testing. If you need more medication when the labs result come in, we will call you and phone them in for you.  PLEASE SET UP YOUR [employer] ACCOUNT SO THAT YOU CAN RECEIVE YOUR LAB RESULTS ONCE THEY RETURN.        IF POSITIVE:     IF YOU ARE POSITIVE FOR AN STD, IT IS RECOMMENDED THAT YOU TEST AGAIN IN 3-6 MONTHS AFTER COMPLETION OF THERAPY TO ENSURE YOU REMAIN NEGATIVE.  CERTAIN POSITIVE TESTS MAY REQUIRE SOONER RE-TESTING AND WE WILL GUIDE YOU IN THESE SITUATIONS.  Notify sexual partners of the need for testing.    NO sexual intercourse until given all lab results and appropriate treatment. Avoid sexual intercourse for 7 days until you and your partner have been treated.  Complete ALL medications prescribed (if required).  Please supplement with OTC probiotics and yogurt if prescribed antibiotics. Take probiotics or eat yogurt 4 hours after you take antibiotics.     Today's testing will give no crediable information if you have unprotected sexual activities going forward.         REMEMBER WEAR CONDOMS AND GET TESTED OFTEN.

## 2025-02-17 NOTE — PROGRESS NOTES
"Subjective:      Patient ID: Swathi Rachel is a 19 y.o. female.    Vitals:  height is 5' 4" (1.626 m) and weight is 63.9 kg (140 lb 14 oz). Her oral temperature is 98.6 °F (37 °C). Her blood pressure is 107/62 and her pulse is 85. Her respiration is 18 and oxygen saturation is 99%.     Chief Complaint: Vaginal Discharge    This is a 19 y.o. female who presents today with a chief complaint of vaginal discharge and odor that started 1 week ago.    Vaginal Discharge  The patient's primary symptoms include a genital odor and vaginal discharge. This is a new problem. The current episode started 1 to 4 weeks ago. The problem occurs constantly. The patient is experiencing no pain. The vaginal discharge was white and thick. There has been no bleeding. She has tried nothing for the symptoms. She is sexually active. She uses an IUD for contraception. Her menstrual history has been irregular.       Genitourinary:  Positive for vaginal discharge.      Pt states she has had BV and yeast multiple times due to her IUD.  When asked she states she uses Tide pods, scented body wash and scented dryer sheets.   Objective:     Physical Exam   Constitutional: She is oriented to person, place, and time.  Non-toxic appearance. She does not appear ill. No distress.   HENT:   Head: Normocephalic and atraumatic.   Nose: No rhinorrhea or congestion.   Eyes: Conjunctivae are normal. Pupils are equal, round, and reactive to light. Extraocular movement intact   Cardiovascular: Normal rate, regular rhythm, normal heart sounds and normal pulses.   Pulmonary/Chest: Effort normal and breath sounds normal. No respiratory distress. She has no wheezes.   Abdominal: Normal appearance.   Genitourinary:         Comments: Refused vaginal exam      Musculoskeletal: Normal range of motion.         General: Normal range of motion.   Neurological: no focal deficit. She is alert and oriented to person, place, and time.   Skin: Skin is warm, dry and not " diaphoretic.   Psychiatric: Her behavior is normal. Mood normal.   Nursing note and vitals reviewed.    Assessment:     1. Vaginal discharge    2. Vaginal odor    3. Screening examination for venereal disease        Plan:       Vaginal discharge  -     C. trachomatis/N. gonorrhoeae by AMP [facility]; Vagina  -     Hepatitis C Antibody  -     HIV 1/2 Ag/Ab (4th Gen)  -     Treponema Pallidium Antibodies IgG, IgM  -     Vaginosis Screen by DNA Probe  -     metroNIDAZOLE (FLAGYL) 500 MG tablet; Take 1 tablet (500 mg total) by mouth every 12 (twelve) hours. for 7 days  Dispense: 14 tablet; Refill: 0    Vaginal odor  -     C. trachomatis/N. gonorrhoeae by AMP [facility]; Vagina  -     Hepatitis C Antibody  -     HIV 1/2 Ag/Ab (4th Gen)  -     Treponema Pallidium Antibodies IgG, IgM  -     Vaginosis Screen by DNA Probe  -     metroNIDAZOLE (FLAGYL) 500 MG tablet; Take 1 tablet (500 mg total) by mouth every 12 (twelve) hours. for 7 days  Dispense: 14 tablet; Refill: 0    Screening examination for venereal disease  -     C. trachomatis/N. gonorrhoeae by AMP [facility]; Vagina  -     Hepatitis C Antibody  -     HIV 1/2 Ag/Ab (4th Gen)  -     Treponema Pallidium Antibodies IgG, IgM      Pt asked for treatment for BV.  She states she has had multiple times and knows the odor.  Pt realizes that she might have to stop the medication if something else comes back positive.

## 2025-02-20 ENCOUNTER — RESULTS FOLLOW-UP (OUTPATIENT)
Dept: URGENT CARE | Facility: CLINIC | Age: 20
End: 2025-02-20

## 2025-02-21 ENCOUNTER — PATIENT MESSAGE (OUTPATIENT)
Dept: URGENT CARE | Facility: CLINIC | Age: 20
End: 2025-02-21
Payer: COMMERCIAL

## 2025-02-21 DIAGNOSIS — B37.31 VAGINAL YEAST INFECTION: Primary | ICD-10-CM

## 2025-02-21 RX ORDER — FLUCONAZOLE 150 MG/1
150 TABLET ORAL DAILY
Qty: 1 TABLET | Refills: 0 | Status: SHIPPED | OUTPATIENT
Start: 2025-02-21 | End: 2025-02-22

## 2025-03-25 ENCOUNTER — OFFICE VISIT (OUTPATIENT)
Dept: URGENT CARE | Facility: CLINIC | Age: 20
End: 2025-03-25
Payer: COMMERCIAL

## 2025-03-25 VITALS
OXYGEN SATURATION: 99 % | HEART RATE: 79 BPM | WEIGHT: 150.44 LBS | BODY MASS INDEX: 25.68 KG/M2 | HEIGHT: 64 IN | RESPIRATION RATE: 18 BRPM | TEMPERATURE: 98 F | SYSTOLIC BLOOD PRESSURE: 103 MMHG | DIASTOLIC BLOOD PRESSURE: 64 MMHG

## 2025-03-25 DIAGNOSIS — N89.8 VAGINAL DISCHARGE: Primary | ICD-10-CM

## 2025-03-25 LAB
B-HCG UR QL: NEGATIVE
BILIRUBIN, UA POC OHS: NEGATIVE
BLOOD, UA POC OHS: NEGATIVE
CLARITY, UA POC OHS: CLEAR
COLOR, UA POC OHS: YELLOW
CTP QC/QA: YES
GLUCOSE, UA POC OHS: NEGATIVE
KETONES, UA POC OHS: NEGATIVE
LEUKOCYTES, UA POC OHS: ABNORMAL
NITRITE, UA POC OHS: NEGATIVE
PH, UA POC OHS: 5.5
PROTEIN, UA POC OHS: NEGATIVE
SPECIFIC GRAVITY, UA POC OHS: 1.01
UROBILINOGEN, UA POC OHS: 0.2

## 2025-03-25 PROCEDURE — 81025 URINE PREGNANCY TEST: CPT | Mod: S$GLB,,, | Performed by: NURSE PRACTITIONER

## 2025-03-25 PROCEDURE — 81003 URINALYSIS AUTO W/O SCOPE: CPT | Mod: QW,S$GLB,, | Performed by: NURSE PRACTITIONER

## 2025-03-25 PROCEDURE — 81515 NFCT DS BV&VAGINITIS DNA ALG: CPT | Performed by: NURSE PRACTITIONER

## 2025-03-25 PROCEDURE — 87798 DETECT AGENT NOS DNA AMP: CPT | Performed by: NURSE PRACTITIONER

## 2025-03-25 PROCEDURE — 87563 M. GENITALIUM AMP PROBE: CPT | Performed by: NURSE PRACTITIONER

## 2025-03-25 PROCEDURE — 99213 OFFICE O/P EST LOW 20 MIN: CPT | Mod: S$GLB,,, | Performed by: NURSE PRACTITIONER

## 2025-03-25 RX ORDER — METRONIDAZOLE 7.5 MG/G
1 GEL VAGINAL NIGHTLY
Qty: 70 G | Refills: 0 | Status: SHIPPED | OUTPATIENT
Start: 2025-03-25 | End: 2025-03-30

## 2025-03-25 RX ORDER — FLUCONAZOLE 150 MG/1
150 TABLET ORAL ONCE
Qty: 1 TABLET | Refills: 0 | Status: SHIPPED | OUTPATIENT
Start: 2025-03-25 | End: 2025-03-25

## 2025-03-25 NOTE — PROGRESS NOTES
"Subjective:      Patient ID: Swathi Rachel is a 19 y.o. female.    Vitals:  height is 5' 4" (1.626 m) and weight is 68.2 kg (150 lb 7.4 oz). Her oral temperature is 98.1 °F (36.7 °C). Her blood pressure is 103/64 and her pulse is 79. Her respiration is 18 and oxygen saturation is 99%.     Chief Complaint: Vaginal Discharge    Swathi presents today with recurrent vaginal discharge.   She reports a long hx of BV and yeast.   She denies urinary symptoms. She is requesting mycoplasma testing today as well.   She has spoken in the past to her GYN about her recurrent BV and yeast.    Vaginal Discharge  The patient's primary symptoms include genital itching, a genital odor and vaginal discharge. The patient's pertinent negatives include no genital lesions, genital rash, missed menses, pelvic pain or vaginal bleeding. This is a chronic problem. The current episode started yesterday. The problem has been gradually worsening. She is not pregnant. Pertinent negatives include no abdominal pain, anorexia, back pain, chills, constipation, diarrhea, discolored urine, dysuria, fever, flank pain, frequency, headaches, hematuria, joint pain, joint swelling, nausea, painful intercourse, rash, sore throat, urgency or vomiting. The vaginal discharge was thick and yellow. There has been no bleeding. She has not been passing clots. She has not been passing tissue. Nothing aggravates the symptoms. She has tried nothing for the symptoms. She uses an IUD for contraception. There is no history of an abdominal surgery, a  section, an ectopic pregnancy, endometriosis, a gynecological surgery, herpes simplex, menorrhagia, metrorrhagia, miscarriage, ovarian cysts, perineal abscess, PID, an STD, a terminated pregnancy or vaginosis.       Constitution: Negative for chills and fever.   HENT:  Negative for sore throat.    Gastrointestinal:  Negative for abdominal pain, history of abdominal surgery, nausea, vomiting, constipation and " diarrhea.   Genitourinary:  Positive for vaginal discharge. Negative for dysuria, frequency, urgency, flank pain, hematuria, missed menses, ovarian cysts and pelvic pain.   Musculoskeletal:  Negative for back pain.   Skin:  Negative for rash.   Neurological:  Negative for headaches.      Objective:     Physical Exam   Constitutional: She is oriented to person, place, and time. She appears well-developed.   HENT:   Head: Normocephalic and atraumatic.   Ears:   Right Ear: External ear normal.   Left Ear: External ear normal.   Nose: Nose normal. No nasal deformity. No epistaxis.   Mouth/Throat: Oropharynx is clear and moist and mucous membranes are normal.   Eyes: Lids are normal.   Neck: Trachea normal and phonation normal. Neck supple.   Cardiovascular: Normal pulses.   Pulmonary/Chest: Effort normal.   Abdominal: Normal appearance and bowel sounds are normal. She exhibits no distension. Soft. There is no abdominal tenderness.   Neurological: She is alert and oriented to person, place, and time.   Skin: Skin is warm, dry and intact.   Psychiatric: Her speech is normal and behavior is normal.   Nursing note and vitals reviewed.    Results for orders placed or performed in visit on 03/25/25   POCT urine pregnancy    Collection Time: 03/25/25 11:20 AM   Result Value Ref Range    POC Preg Test, Ur Negative Negative     Acceptable Yes    POCT Urinalysis(Instrument)    Collection Time: 03/25/25 11:20 AM   Result Value Ref Range    Color, POC UA Yellow Yellow, Straw, Colorless    Clarity, POC UA Clear Clear    Glucose, POC UA Negative Negative    Bilirubin, POC UA Negative Negative    Ketones, POC UA Negative Negative    Spec Grav POC UA 1.010 1.005 - 1.030    Blood, POC UA Negative Negative    pH, POC UA 5.5 5.0 - 8.0    Protein, POC UA Negative Negative    Urobilinogen, POC UA 0.2 <=1.0    Nitrite, POC UA Negative Negative    WBC, POC UA Small (A) Negative       Assessment:     1. Vaginal discharge         Plan:   Today we will try vaginal suppositories for her BV    Vaginal discharge  -     Mycoplasma genitalium Molecular Detection, PCR Urine; Future; Expected date: 03/25/2025  -     Ureaplasma PCR  -     Vaginosis Screen by DNA Probe  -     metroNIDAZOLE (METROGEL) 0.75 % (37.5mg/5 gram) vaginal gel; Place 1 applicator vaginally every evening. for 5 days  Dispense: 70 g; Refill: 0  -     fluconazole (DIFLUCAN) 150 MG Tab; Take 1 tablet (150 mg total) by mouth once. for 1 dose  Dispense: 1 tablet; Refill: 0  -     POCT urine pregnancy  -     POCT Urinalysis(Instrument)                  Patient Instructions   We will call once results are received. Avoid sexual activity until you complete treatment.    To prevent Bacterial Vaginosis infections:  - Wear breathable cotton underwear  - Keep external genitalia clean and dry  - Change out of wet bathing suits/athletic clothing as soon as possible  - Do not use douches, perfumed soap, creams, bubble baths or perfumed feminine hygiene products  - Use Dove Sensitive Skin soap or just warm water without any soap to wash your vaginal area  - Use use unscented detergents and dryer sheets to was your clothes  - Avoid sexual activity while symptomatic  - Try taking an over the counter probiotic daily or eating yogurt.      Get tested for Sexually Transmitted Infections if you have had unprotected sex.

## 2025-03-25 NOTE — PATIENT INSTRUCTIONS
We will call once results are received. Avoid sexual activity until you complete treatment.    To prevent Bacterial Vaginosis infections:  - Wear breathable cotton underwear  - Keep external genitalia clean and dry  - Change out of wet bathing suits/athletic clothing as soon as possible  - Do not use douches, perfumed soap, creams, bubble baths or perfumed feminine hygiene products  - Use Dove Sensitive Skin soap or just warm water without any soap to wash your vaginal area  - Use use unscented detergents and dryer sheets to was your clothes  - Avoid sexual activity while symptomatic  - Try taking an over the counter probiotic daily or eating yogurt.      Get tested for Sexually Transmitted Infections if you have had unprotected sex.

## 2025-03-27 LAB
MYCOPLASMA GENITALIUM RESULT: POSITIVE
SPECIMEN SOURCE: ABNORMAL

## 2025-03-28 ENCOUNTER — TELEPHONE (OUTPATIENT)
Dept: URGENT CARE | Facility: CLINIC | Age: 20
End: 2025-03-28
Payer: COMMERCIAL

## 2025-03-28 ENCOUNTER — RESULTS FOLLOW-UP (OUTPATIENT)
Dept: URGENT CARE | Facility: CLINIC | Age: 20
End: 2025-03-28

## 2025-03-28 DIAGNOSIS — A49.3 INFECTION DUE TO MYCOPLASMA GENITALIUM: Primary | ICD-10-CM

## 2025-03-28 LAB
BACTERIAL VAGINOSIS DNA (OHS): DETECTED
CANDIDA GLABRATA/KRUSEI DNA (OHS): NOT DETECTED
CANDIDA SPECIES DNA (OHS): DETECTED
TRICHOMONAS VAGINALIS DNA (OHS): NOT DETECTED

## 2025-03-28 RX ORDER — AZITHROMYCIN 250 MG/1
TABLET, FILM COATED ORAL
Qty: 6 TABLET | Refills: 0 | Status: SHIPPED | OUTPATIENT
Start: 2025-03-28

## 2025-03-28 NOTE — TELEPHONE ENCOUNTER
Called student and informed her that her BV and yeast are positive.  She will take the medication that VARINDER Rivers sent to the pharmacy for her.  Pt verbalized understanding and had no questions at this time.

## 2025-03-28 NOTE — TELEPHONE ENCOUNTER
Called student to inform her that her Mycoplasma is positive.  Called in Zpak for her.  She has not started any of the medications that was sent by VARINDER Rivers.  She was waiting for test results.  Will start the Zpak now and wait for the BV/Yeast results to start the other medications if needed.

## 2025-03-29 LAB
SPECIMEN SOURCE: ABNORMAL
U PARVUM DNA SPEC QL NAA+PROBE: POSITIVE
U UREALYTICUM DNA SPEC QL NAA+PROBE: NEGATIVE

## 2025-03-31 ENCOUNTER — PATIENT MESSAGE (OUTPATIENT)
Dept: URGENT CARE | Facility: CLINIC | Age: 20
End: 2025-03-31
Payer: COMMERCIAL

## 2025-04-03 ENCOUNTER — OFFICE VISIT (OUTPATIENT)
Dept: URGENT CARE | Facility: CLINIC | Age: 20
End: 2025-04-03
Payer: COMMERCIAL

## 2025-04-03 VITALS
BODY MASS INDEX: 24.88 KG/M2 | RESPIRATION RATE: 19 BRPM | TEMPERATURE: 98 F | HEART RATE: 95 BPM | HEIGHT: 64 IN | WEIGHT: 145.75 LBS | SYSTOLIC BLOOD PRESSURE: 102 MMHG | DIASTOLIC BLOOD PRESSURE: 66 MMHG | OXYGEN SATURATION: 99 %

## 2025-04-03 DIAGNOSIS — Z83.3 FAMILY HISTORY OF DIABETES MELLITUS IN FATHER: ICD-10-CM

## 2025-04-03 DIAGNOSIS — B37.31 RECURRENT CANDIDIASIS OF VAGINA: Primary | ICD-10-CM

## 2025-04-03 DIAGNOSIS — H01.136 ECZEMATOUS DERMATITIS OF EYELIDS OF BOTH EYES, UNSPECIFIED EYELID: ICD-10-CM

## 2025-04-03 DIAGNOSIS — H01.133 ECZEMATOUS DERMATITIS OF EYELIDS OF BOTH EYES, UNSPECIFIED EYELID: ICD-10-CM

## 2025-04-03 DIAGNOSIS — B37.31 VAGINAL YEAST INFECTION: ICD-10-CM

## 2025-04-03 LAB
EAG (OHS): 88 MG/DL (ref 68–131)
HBA1C MFR BLD: 4.7 % (ref 4–5.6)

## 2025-04-03 PROCEDURE — 83036 HEMOGLOBIN GLYCOSYLATED A1C: CPT | Performed by: NURSE PRACTITIONER

## 2025-04-03 PROCEDURE — 99214 OFFICE O/P EST MOD 30 MIN: CPT | Mod: S$GLB,,, | Performed by: NURSE PRACTITIONER

## 2025-04-03 RX ORDER — FLUCONAZOLE 150 MG/1
150 TABLET ORAL DAILY
Qty: 1 TABLET | Refills: 0 | Status: SHIPPED | OUTPATIENT
Start: 2025-04-03 | End: 2025-04-03

## 2025-04-03 RX ORDER — FLUCONAZOLE 150 MG/1
150 TABLET ORAL DAILY
Qty: 2 TABLET | Refills: 0 | Status: SHIPPED | OUTPATIENT
Start: 2025-04-03 | End: 2025-04-04

## 2025-04-03 NOTE — PROGRESS NOTES
"Subjective:      Patient ID: Swathi Rachel is a 19 y.o. female.    Vitals:  height is 5' 4" (1.626 m) and weight is 66.1 kg (145 lb 11.6 oz). Her oral temperature is 98.2 °F (36.8 °C). Her blood pressure is 102/66 and her pulse is 95. Her respiration is 19 and oxygen saturation is 99%.     Chief Complaint: Vaginal Discharge    19 year old female co vaginal irritation and discharge. Pt stated she was prescribed rx for a yeast infection but medication has no effect. Pt stated she has discharge and wants to check to make sure yeast infection is gone.    Vaginal Discharge  The patient's primary symptoms include vaginal discharge. The current episode started in the past 7 days. The problem occurs constantly. The problem has been unchanged. The patient is experiencing no pain. Associated symptoms include frequency. The vaginal discharge was white, yellow and thick. There has been no bleeding. She has not been passing clots. She has not been passing tissue. Nothing aggravates the symptoms. She uses an IUD for contraception.       Genitourinary:  Positive for frequency and vaginal discharge.      Pt is very upset today and crying.  States she has another yeast infection with chunky thick white discharge that is itchy.  She states that she has done all the things that we have told her to prevent yeast infections and she is still getting them.  Pt also talks about the probiotics she is taking.  She has seen a GI doctor which suggested she stop one she was taking because it was causing bloating.  She is wondering if the one she is taking now could be causing her yeast infections.    Objective:     Physical Exam   Constitutional: She is oriented to person, place, and time. She is cooperative.  Non-toxic appearance. She does not appear ill. No distress.   HENT:   Head: Normocephalic and atraumatic.   Nose: No rhinorrhea or congestion.   Eyes: Conjunctivae are normal. Pupils are equal, round, and reactive to light. Extraocular " movement intact   Cardiovascular: Normal rate, regular rhythm, normal heart sounds and normal pulses.   Pulmonary/Chest: Effort normal and breath sounds normal. No respiratory distress. She has no wheezes.   Abdominal: Normal appearance.   Musculoskeletal: Normal range of motion.         General: Normal range of motion.   Neurological: no focal deficit. She is alert and oriented to person, place, and time.   Skin: Skin is dry and not diaphoretic.         Comments: Dry skin and red patches to bilateral upper eyelids (mild)   Psychiatric: She experiences Normal attention. Her speech is normal and behavior is normal. Mood and thought content normal. Her affect is tearful.   Nursing note and vitals reviewed.    Assessment:     1. Recurrent candidiasis of vagina    2. Vaginal yeast infection    3. Eczematous dermatitis of eyelids of both eyes, unspecified eyelid    4. Family history of diabetes mellitus in father        Plan:       Recurrent candidiasis of vagina  -     Hemoglobin A1C    Vaginal yeast infection  -     Discontinue: fluconazole (DIFLUCAN) 150 MG Tab; Take 1 tablet (150 mg total) by mouth once daily. for 1 day  Dispense: 1 tablet; Refill: 0  -     Hemoglobin A1C  -     fluconazole (DIFLUCAN) 150 MG Tab; Take 1 tablet (150 mg total) by mouth once daily. Take 1 now and repeat in 3 days if needed for 1 day  Dispense: 2 tablet; Refill: 0    Eczematous dermatitis of eyelids of both eyes, unspecified eyelid    Family history of diabetes mellitus in father  -     Hemoglobin A1C    Spoke to student about any diabetics in her family.  Informed her that diabetes can cause an increase in yeast infection.  She states that her father is a diabetic but not certain what kind.  I also told her to reach out in her portal to her GI specialist about the probiotic she is taking and ask him if the one she is taking now could cause yeast infections in general.  Will draw an A1C today to r/o diabetes as the cause of frequent  yeast infections.  Pt verbalized understanding.

## 2025-04-03 NOTE — LETTER
April 3, 2025    Swathi Rachel  [address]  [place] [address]             Urgent Care - [facility]  Urgent Care  [address] [address]  Phone: [phone]  Fax: [phone]   April 3, 2025     Patient: Swathi Rachel   YOB: 2005   Date of Visit: 4/3/2025       To Whom it May Concern:    Swathi Rachel was seen in my clinic on 4/3/2025. She may return to school on 04/04/25 .    Please excuse her from any classes or work missed.    If you have any questions or concerns, please don't hesitate to call.    Sincerely,         Margot Villela, NP

## 2025-04-03 NOTE — PATIENT INSTRUCTIONS
Use yellow baby wash to clean the eye lids then Aquaphor to moist.  Allegra daily for eye swelling.

## 2025-04-04 ENCOUNTER — PATIENT MESSAGE (OUTPATIENT)
Dept: URGENT CARE | Facility: CLINIC | Age: 20
End: 2025-04-04
Payer: COMMERCIAL

## 2025-04-08 ENCOUNTER — OFFICE VISIT (OUTPATIENT)
Dept: URGENT CARE | Facility: CLINIC | Age: 20
End: 2025-04-08
Payer: COMMERCIAL

## 2025-04-08 VITALS
BODY MASS INDEX: 25.03 KG/M2 | HEART RATE: 77 BPM | WEIGHT: 146.63 LBS | OXYGEN SATURATION: 99 % | DIASTOLIC BLOOD PRESSURE: 65 MMHG | SYSTOLIC BLOOD PRESSURE: 108 MMHG | RESPIRATION RATE: 19 BRPM | HEIGHT: 64 IN | TEMPERATURE: 98 F

## 2025-04-08 DIAGNOSIS — M54.9 BACK PAIN, UNSPECIFIED BACK LOCATION, UNSPECIFIED BACK PAIN LATERALITY, UNSPECIFIED CHRONICITY: ICD-10-CM

## 2025-04-08 DIAGNOSIS — R31.9 URINARY TRACT INFECTION WITH HEMATURIA, SITE UNSPECIFIED: Primary | ICD-10-CM

## 2025-04-08 DIAGNOSIS — N39.0 URINARY TRACT INFECTION WITH HEMATURIA, SITE UNSPECIFIED: Primary | ICD-10-CM

## 2025-04-08 LAB
BILIRUBIN, UA POC OHS: NEGATIVE
BLOOD, UA POC OHS: ABNORMAL
CLARITY, UA POC OHS: CLEAR
COLOR, UA POC OHS: YELLOW
GLUCOSE, UA POC OHS: NEGATIVE
KETONES, UA POC OHS: NEGATIVE
LEUKOCYTES, UA POC OHS: ABNORMAL
NITRITE, UA POC OHS: NEGATIVE
PH, UA POC OHS: 5.5
PROTEIN, UA POC OHS: NEGATIVE
SPECIFIC GRAVITY, UA POC OHS: 1.01
UROBILINOGEN, UA POC OHS: 0.2

## 2025-04-08 PROCEDURE — 99213 OFFICE O/P EST LOW 20 MIN: CPT | Mod: S$GLB,,, | Performed by: NURSE PRACTITIONER

## 2025-04-08 PROCEDURE — 81003 URINALYSIS AUTO W/O SCOPE: CPT | Mod: QW,S$GLB,, | Performed by: NURSE PRACTITIONER

## 2025-04-08 RX ORDER — NITROFURANTOIN 25; 75 MG/1; MG/1
100 CAPSULE ORAL 2 TIMES DAILY
Qty: 10 CAPSULE | Refills: 0 | Status: SHIPPED | OUTPATIENT
Start: 2025-04-08 | End: 2025-04-13

## 2025-04-08 NOTE — LETTER
April 8, 2025    Swathi Rachel  [address]  [place] [address]             Urgent Care - [facility]  Urgent Care  [address] [address]  Phone: [phone]  Fax: [phone]   April 8, 2025     Patient: Swathi Rachel   YOB: 2005   Date of Visit: 4/8/2025       To Whom it May Concern:    Swathi Rachel was seen in my clinic on 4/8/2025. She may return to school on 04/09/25 .    Please excuse her from any classes or work missed.    If you have any questions or concerns, please don't hesitate to call.    Sincerely,         Margot Villela, NP

## 2025-04-08 NOTE — PROGRESS NOTES
"Subjective:      Patient ID: Swathi Rachel is a 19 y.o. female.    Vitals:  height is 5' 4" (1.626 m) and weight is 66.5 kg (146 lb 9.7 oz). Her oral temperature is 98.3 °F (36.8 °C). Her blood pressure is 108/65 and her pulse is 77. Her respiration is 19 and oxygen saturation is 99%.     Chief Complaint: Back Pain    19 year old female co back pain as she urinates. Pt states she has cramping in abdominal area also while urinating. Pt states symptoms started on yesterday. Pt also states she never had an UTI so she doesn't know if that is an issue. Pt also states she also has an implant and no sure if it has moved.    Back Pain  This is a new problem. The current episode started yesterday. The problem occurs constantly. The quality of the pain is described as cramping. The symptoms are aggravated by urinating. Associated symptoms include pelvic pain. She has tried nothing for the symptoms.       Genitourinary:  Positive for pelvic pain.   Musculoskeletal:  Positive for back pain.     Pt states she was with her boyfriend this past weekend for the first time in months and they had intercourse multiple times.  The last time was Sunday and she started having urinary symptoms, abdomen cramps and back pain yesterday.   Objective:     Physical Exam   Constitutional: She is oriented to person, place, and time.  Non-toxic appearance. She does not appear ill. No distress.   HENT:   Head: Normocephalic and atraumatic.   Ears:   Right Ear: Tympanic membrane normal.   Left Ear: Tympanic membrane normal.   Nose: No rhinorrhea or congestion.   Mouth/Throat: Mucous membranes are moist. Oropharynx is clear.   Eyes: Conjunctivae are normal. Pupils are equal, round, and reactive to light. Extraocular movement intact   Cardiovascular: Normal rate, regular rhythm, normal heart sounds and normal pulses.   Pulmonary/Chest: Effort normal and breath sounds normal. No respiratory distress. She has no wheezes.   Abdominal: Normal appearance " and bowel sounds are normal. She exhibits no distension. Soft. flat abdomen There is no abdominal tenderness. There is no rebound, no guarding, no left CVA tenderness and no right CVA tenderness.   Musculoskeletal: Normal range of motion.         General: Normal range of motion.   Neurological: no focal deficit. She is alert and oriented to person, place, and time.   Skin: Skin is warm, dry and not diaphoretic.   Psychiatric: Her behavior is normal. Mood normal.   Nursing note and vitals reviewed.  Results for orders placed or performed in visit on 04/08/25   POCT Urinalysis(Instrument)    Collection Time: 04/08/25  2:11 PM   Result Value Ref Range    Color, POC UA Yellow Yellow, Straw, Colorless    Clarity, POC UA Clear Clear    Glucose, POC UA Negative Negative    Bilirubin, POC UA Negative Negative    Ketones, POC UA Negative Negative    Spec Grav POC UA 1.010 1.005 - 1.030    Blood, POC UA Large (A) Negative    pH, POC UA 5.5 5.0 - 8.0    Protein, POC UA Negative Negative    Urobilinogen, POC UA 0.2 <=1.0    Nitrite, POC UA Negative Negative    WBC, POC UA Small (A) Negative     Pt is not on her cycle.   Assessment:     1. Urinary tract infection with hematuria, site unspecified    2. Back pain, unspecified back location, unspecified back pain laterality, unspecified chronicity        Plan:       Urinary tract infection with hematuria, site unspecified  -     nitrofurantoin, macrocrystal-monohydrate, (MACROBID) 100 MG capsule; Take 1 capsule (100 mg total) by mouth 2 (two) times daily. for 5 days  Dispense: 10 capsule; Refill: 0    Back pain, unspecified back location, unspecified back pain laterality, unspecified chronicity  -     POCT Urinalysis(Instrument)      Pt has been on a lot of medication recently for yeast infections, mycoplasma genitalium, BV, etc.  Discussed the abdomen cramping could also be peristalsis from disruption of normal elle and potential oncoming diarrhea from all the antibiotics and  antifungals she has been on recently.  Encouraged her to start probiotics since she is starting another antibiotic for this current UTI and may get another yeast infection.  Pt verbalized understanding.

## 2025-04-10 ENCOUNTER — TELEPHONE (OUTPATIENT)
Dept: URGENT CARE | Facility: CLINIC | Age: 20
End: 2025-04-10
Payer: COMMERCIAL

## 2025-04-10 DIAGNOSIS — N89.8 VAGINAL DISCHARGE: ICD-10-CM

## 2025-04-10 DIAGNOSIS — N89.8 VAGINAL ITCHING: Primary | ICD-10-CM

## 2025-04-10 RX ORDER — FLUCONAZOLE 150 MG/1
150 TABLET ORAL DAILY
Qty: 2 TABLET | Refills: 0 | Status: SHIPPED | OUTPATIENT
Start: 2025-04-10 | End: 2025-04-12

## 2025-04-10 NOTE — TELEPHONE ENCOUNTER
Pt called and states she now has a yeast infection from all the antibiotics she has been taking.  Thick white cottage cheese d/c with vaginal itching.  Will call in Diflucan for her.

## 2025-04-16 ENCOUNTER — OFFICE VISIT (OUTPATIENT)
Dept: URGENT CARE | Facility: CLINIC | Age: 20
End: 2025-04-16
Payer: COMMERCIAL

## 2025-04-16 VITALS
SYSTOLIC BLOOD PRESSURE: 101 MMHG | RESPIRATION RATE: 18 BRPM | HEIGHT: 64 IN | WEIGHT: 146.63 LBS | OXYGEN SATURATION: 97 % | HEART RATE: 71 BPM | BODY MASS INDEX: 25.03 KG/M2 | DIASTOLIC BLOOD PRESSURE: 65 MMHG | TEMPERATURE: 98 F

## 2025-04-16 DIAGNOSIS — Z11.3 SCREENING FOR STD (SEXUALLY TRANSMITTED DISEASE): Primary | ICD-10-CM

## 2025-04-16 LAB
HCV AB SERPL QL IA: NORMAL
HIV 1+2 AB+HIV1 P24 AG SERPL QL IA: NORMAL
HOLD SPECIMEN: NORMAL
T PALLIDUM IGG+IGM SER QL: NORMAL

## 2025-04-16 PROCEDURE — 99213 OFFICE O/P EST LOW 20 MIN: CPT | Mod: S$GLB,,, | Performed by: INTERNAL MEDICINE

## 2025-04-16 PROCEDURE — 86593 SYPHILIS TEST NON-TREP QUANT: CPT | Performed by: INTERNAL MEDICINE

## 2025-04-16 PROCEDURE — 87389 HIV-1 AG W/HIV-1&-2 AB AG IA: CPT | Performed by: INTERNAL MEDICINE

## 2025-04-16 PROCEDURE — 86803 HEPATITIS C AB TEST: CPT | Performed by: INTERNAL MEDICINE

## 2025-04-16 PROCEDURE — 36415 COLL VENOUS BLD VENIPUNCTURE: CPT | Mod: ,,, | Performed by: INTERNAL MEDICINE

## 2025-04-16 PROCEDURE — 87591 N.GONORRHOEAE DNA AMP PROB: CPT | Performed by: INTERNAL MEDICINE

## 2025-04-16 NOTE — PATIENT INSTRUCTIONS
IF YOU ARE TESTING TODAY WITH A CONCERN FOR A POTENTIAL EXPOSURE TO AN STD AFTER UNPROTECTED SEXUAL INTERCOURSE, IT IS RECOMMENDED TO TEST TODAY AND AGAIN IN 4-6 WEEKS AFTER THE POTENTIAL EXPOSURE TO ENSURE TRUE NEGATIVE RESULTS.      REMAIN ABSTINENT UNTIL AFTER YOUR TESTS HAVE RESULTED    We will call you in 3-7 days with the results of the testing. If you need more medication when the labs result come in, we will call you and phone them in for you.  PLEASE SET UP YOUR [employer] ACCOUNT SO THAT YOU CAN RECEIVE YOUR LAB RESULTS ONCE THEY RETURN.        IF POSITIVE for an STD:     IF YOU ARE POSITIVE FOR AN STD, IT IS RECOMMENDED THAT YOU TEST AGAIN IN 3-6 MONTHS AFTER COMPLETION OF THERAPY TO ENSURE YOU REMAIN NEGATIVE.    CERTAIN POSITIVE TESTS MAY REQUIRE SOONER RE-TESTING AND WE WILL GUIDE YOU IN THESE SITUATIONS.    Notify sexual partners of the need for testing.      NO sexual intercourse until given all lab results and appropriate treatment. Avoid sexual intercourse for 7 days until you and your partner have been treated.    Complete ALL medications prescribed (if required).  Please supplement with OTC probiotics and yogurt if prescribed antibiotics. Take probiotics or eat yogurt 4 hours after you take antibiotics.           Today's testing will give no crediable information if you have unprotected sexual activities going forward.         REMEMBER WEAR CONDOMS AND GET TESTED OFTEN.

## 2025-04-16 NOTE — PROGRESS NOTES
"Subjective:      Patient ID: Swathi Rachel is a 19 y.o. female.    Vitals:  height is 5' 4" (1.626 m) and weight is 66.5 kg (146 lb 9.7 oz). Her oral temperature is 98.4 °F (36.9 °C). Her blood pressure is 101/65 and her pulse is 71. Her respiration is 18 and oxygen saturation is 97%.     Chief Complaint: Exposure to STD    This is a 19 y.o. female who presents today with a chief complaint of retest for std testing.     Exposure to STD   This is a new problem.     ROS   Objective:     Physical Exam   Constitutional: She is oriented to person, place, and time.  Non-toxic appearance. She does not appear ill. No distress.   HENT:   Head: Normocephalic and atraumatic.   Ears:   Right Ear: External ear normal.   Left Ear: External ear normal.   Nose: Nose normal. No congestion.   Pulmonary/Chest: No respiratory distress.   Abdominal: Normal appearance.   Neurological: She is alert and oriented to person, place, and time.   Skin: Skin is warm, dry and not diaphoretic.   Psychiatric: Her behavior is normal. Mood, judgment and thought content normal.   Vitals reviewed.      Assessment:     1. Screening for STD (sexually transmitted disease)        Plan:       Screening for STD (sexually transmitted disease)  -     C. trachomatis/N. gonorrhoeae by AMP DNA [facility]; Vagina  -     HIV 1/2 Ag/Ab (4th Gen)  -     Hepatitis C Antibody  -     Treponema Pallidium Antibodies IgG, IgM  -     EXTRA TUBES; Future; Expected date: 04/16/2025                    "

## 2025-04-21 LAB
C TRACH DNA SPEC QL NAA+PROBE: NOT DETECTED
CTGC SOURCE (OHS) ORD-325: NORMAL
N GONORRHOEA DNA UR QL NAA+PROBE: NOT DETECTED

## 2025-04-28 ENCOUNTER — E-VISIT (OUTPATIENT)
Dept: FAMILY MEDICINE | Facility: CLINIC | Age: 20
End: 2025-04-28
Payer: COMMERCIAL

## 2025-04-28 DIAGNOSIS — N76.0 ACUTE VAGINITIS: Primary | ICD-10-CM

## 2025-04-28 RX ORDER — METRONIDAZOLE 7.5 MG/G
1 GEL VAGINAL DAILY
Qty: 5 G | Refills: 0 | Status: SHIPPED | OUTPATIENT
Start: 2025-04-28 | End: 2025-05-03

## 2025-04-28 RX ORDER — FLUCONAZOLE 150 MG/1
150 TABLET ORAL ONCE
Qty: 1 TABLET | Refills: 0 | Status: SHIPPED | OUTPATIENT
Start: 2025-04-28 | End: 2025-04-28

## 2025-04-28 NOTE — PROGRESS NOTES
Patient ID: Swathi Rachel is a 19 y.o. female.    Chief Complaint: General Illness (Entered automatically based on patient selection in [employer].)          274}  The patient initiated a request through [employer] on 4/28/2025 for evaluation and management with a chief complaint of General Illness (Entered automatically based on patient selection in [employer].)     I evaluated the questionnaire submission on 04/28/2025 .    Total Time (in minutes): 12     [facility]    4/28/2025 10:42 AM CDT - Filed by Patient   What do you need help with? Bacterial Vaginosis   Do you agree to participate in an E-Visit? Yes   If you have any of the following symptoms,  please do not complete an E-Visit,  schedule an appointment with your provider: I acknowledge   Do you have any of the following pregnancy-related conditions? None   What is the main issue you would like addressed today? yeast infection/BV   Which of the following vaginal concerns do you have? Discharge;  Itching   Do you have vaginal discharge? White/milky discharge   Do you have pain while passing urine? No   Do you have any of the following symptoms? Lower back pain;  None of the above    Have you taken antibiotics in the last two weeks? Not sure    Do you use any of the following? No   Which of the following applies to your menstrual period? Have not had for a while   Which of the following applies to your menstrual cycle? No bleeding   Do you have spotting between periods? No   Do you have pain with your period? No   Have you had similar symptoms in the past? Frequently   When you had similar symptoms in the past, did any of the following work? None of the above   Have you had a temperature of 100.4 or higher? No   Provide any additional information you feel is important.    Please attach any relevant images or files    Are you able to take your vital signs? No          There are no active problems to display for this patient.     Recent  Labs Obtained:  Lab Results   Component Value Date    WBC 6.23 10/23/2024    HGB 14.0 10/23/2024    HCT 42.9 10/23/2024    MCV 94 10/23/2024     10/23/2024     10/23/2024    K 4.3 10/23/2024    GLU 81 10/23/2024    CREATININE 0.9 10/23/2024    EGFRNORACEVR >60.0 10/23/2024    HGBA1C 4.7 04/03/2025    TSH 0.775 10/23/2024      Review of patient's allergies indicates:  No Known Allergies    Encounter Diagnosis   Name Primary?    Acute vaginitis Yes        No orders of the defined types were placed in this encounter.     Medications Ordered This Encounter   Medications    fluconazole (DIFLUCAN) 150 MG Tab     Sig: Take 1 tablet (150 mg total) by mouth once. for 1 dose     Dispense:  1 tablet     Refill:  0    metroNIDAZOLE (METROGEL VAGINAL) 0.75 % (37.5mg/5 gram) vaginal gel     Sig: Place 1 applicator vaginally once daily. for 5 days     Dispense:  5 g     Refill:  0        E-Visit Time Tracking:    Day 1 Time (in minutes): 12    Total Time (in minutes): 12      274}

## 2025-05-02 ENCOUNTER — E-VISIT (OUTPATIENT)
Dept: URGENT CARE | Facility: CLINIC | Age: 20
End: 2025-05-02
Payer: COMMERCIAL

## 2025-05-02 DIAGNOSIS — N76.0 BACTERIAL VAGINOSIS: Primary | ICD-10-CM

## 2025-05-02 DIAGNOSIS — B96.89 BACTERIAL VAGINOSIS: Primary | ICD-10-CM

## 2025-05-02 RX ORDER — METRONIDAZOLE 500 MG/1
500 TABLET ORAL EVERY 12 HOURS
Qty: 14 TABLET | Refills: 0 | Status: SHIPPED | OUTPATIENT
Start: 2025-05-02 | End: 2025-05-09

## 2025-05-02 NOTE — PROGRESS NOTES
Patient ID: Swathi Rachel is a 19 y.o. female.    Chief Complaint: General Illness (Entered automatically based on patient selection in [employer].)    The patient initiated a request through [employer] on 5/2/2025 for evaluation and management with a chief complaint of General Illness (Entered automatically based on patient selection in [employer].)     I evaluated the questionnaire submission on 05/02/2025 .    Ohs Peq Evisit [facility]    5/2/2025 12:54 PM CDT - Filed by Patient   What do you need help with? Bacterial Vaginosis   Do you agree to participate in an E-Visit? Yes   If you have any of the following symptoms,  please do not complete an E-Visit,  schedule an appointment with your provider: I acknowledge   Do you have any of the following pregnancy-related conditions? None   What is the main issue you would like addressed today? bacterial vaginosis   Which of the following vaginal concerns do you have? Discharge;  Itching   Do you have vaginal discharge? Yellow/green discharge   Do you have pain while passing urine? No   Do you have any of the following symptoms? None of the above    Have you taken antibiotics in the last two weeks? No    Do you use any of the following? No   Which of the following applies to your menstrual period? Have not had for a while   Which of the following applies to your menstrual cycle? No bleeding   Do you have spotting between periods? No   Do you have pain with your period? No   Have you had similar symptoms in the past? Frequently   When you had similar symptoms in the past, did any of the following work? Not sure   Have you had a temperature of 100.4 or higher? No   Provide any additional information you feel is important. yellowy discharge, fishy smell   Please attach any relevant images or files    Are you able to take your vital signs? No         Encounter Diagnosis   Name Primary?    Bacterial vaginosis Yes        No orders of the defined types were placed in  this encounter.     Medications Ordered This Encounter   Medications    metroNIDAZOLE (FLAGYL) 500 MG tablet     Sig: Take 1 tablet (500 mg total) by mouth every 12 (twelve) hours. for 7 days     Dispense:  14 tablet     Refill:  0        No follow-ups on file.      E-Visit Time Tracking:    Day 1 Time (in minutes): 5    Total Time (in minutes): 5

## 2025-05-06 ENCOUNTER — OFFICE VISIT (OUTPATIENT)
Dept: URGENT CARE | Facility: CLINIC | Age: 20
End: 2025-05-06
Payer: COMMERCIAL

## 2025-05-06 VITALS
RESPIRATION RATE: 16 BRPM | HEART RATE: 78 BPM | WEIGHT: 147.38 LBS | OXYGEN SATURATION: 98 % | DIASTOLIC BLOOD PRESSURE: 65 MMHG | HEIGHT: 64 IN | SYSTOLIC BLOOD PRESSURE: 103 MMHG | BODY MASS INDEX: 25.16 KG/M2 | TEMPERATURE: 98 F

## 2025-05-06 DIAGNOSIS — N89.8 VAGINAL ITCHING: Primary | ICD-10-CM

## 2025-05-06 DIAGNOSIS — N89.8 VAGINAL DISCHARGE: ICD-10-CM

## 2025-05-06 PROCEDURE — 99213 OFFICE O/P EST LOW 20 MIN: CPT | Mod: S$GLB,,, | Performed by: NURSE PRACTITIONER

## 2025-05-06 PROCEDURE — 81515 NFCT DS BV&VAGINITIS DNA ALG: CPT | Performed by: NURSE PRACTITIONER

## 2025-05-06 RX ORDER — FLUCONAZOLE 150 MG/1
150 TABLET ORAL DAILY
Qty: 24 TABLET | Refills: 0 | Status: SHIPPED | OUTPATIENT
Start: 2025-05-06 | End: 2025-05-30

## 2025-05-06 RX ORDER — FLUCONAZOLE 150 MG/1
150 TABLET ORAL DAILY
Qty: 2 TABLET | Refills: 0 | Status: SHIPPED | OUTPATIENT
Start: 2025-05-06 | End: 2025-05-08

## 2025-05-06 NOTE — PROGRESS NOTES
"Subjective:      Patient ID: Swathi Rachel is a 19 y.o. female.    Vitals:  height is 5' 4" (1.626 m) and weight is 66.8 kg (147 lb 6 oz). Her oral temperature is 98.3 °F (36.8 °C). Her blood pressure is 103/65 and her pulse is 78. Her respiration is 16 and oxygen saturation is 98%.     Chief Complaint: Vaginal Discharge and Vaginal Itching    20 yo female pt c/o vaginal itching, odor and a chunky slightly yellow discharge X 4 days ago. Pt denies any other symptoms. Pt states she is sexually active. Pt. Was diagnosed via [facility] as having BV and a yeast infection. Pt. States this has been ongoing for a year at least once a week. Pt is here to get clarity.       Vaginal Discharge  The patient's primary symptoms include genital itching, a genital odor and vaginal discharge. The current episode started in the past 7 days. The problem occurs constantly. The patient is experiencing no pain. She is not pregnant. Associated symptoms include constipation. The vaginal discharge was yellow and thick. She has tried antifungals for the symptoms. The treatment provided no relief. She is sexually active. No, her partner does not have an STD. She uses an IUD for contraception. Menstrual history: Implant. Her past medical history is significant for an STD.   Vaginal Itching  The patient's primary symptoms include genital itching, a genital odor and vaginal discharge. Associated symptoms include constipation. The vaginal discharge was thick and yellow. She has not been passing clots. She has not been passing tissue. She has tried antifungals for the symptoms. The treatment provided no relief. She is sexually active. No, her partner does not have an STD. She uses an IUD for contraception. Her past medical history is significant for an STD.       Gastrointestinal:  Positive for constipation.   Genitourinary:  Positive for vaginal discharge.      Objective:     Physical Exam   Constitutional: She is oriented to person, place, " and time.  Non-toxic appearance. She does not appear ill. No distress.   HENT:   Head: Normocephalic and atraumatic.   Nose: No rhinorrhea or congestion.   Eyes: Conjunctivae are normal. Pupils are equal, round, and reactive to light. Extraocular movement intact   Neck: Neck supple.   Cardiovascular: Normal rate and regular rhythm.   Pulmonary/Chest: Effort normal. No respiratory distress.   Abdominal: Normal appearance.   Genitourinary:         Comments: deferred     Musculoskeletal: Normal range of motion.         General: Normal range of motion.   Neurological: no focal deficit. She is alert and oriented to person, place, and time.   Skin: Skin is warm, dry and not diaphoretic.   Psychiatric: Her behavior is normal. Mood normal.   Nursing note and vitals reviewed.    Assessment:     1. Vaginal itching    2. Vaginal discharge        Plan:       Vaginal itching  -     Vaginosis Screen by DNA Probe  -     fluconazole (DIFLUCAN) 150 MG Tab; Take 1 tablet (150 mg total) by mouth once daily. Take 1 now and repeat in 3 days. for 2 doses  Dispense: 2 tablet; Refill: 0  -     fluconazole (DIFLUCAN) 150 MG Tab; Take 1 tablet (150 mg total) by mouth once daily. Take 1 tablet a week for 6 months to prevent reoccurrence for 24 days  Dispense: 24 tablet; Refill: 0    Vaginal discharge  -     Vaginosis Screen by DNA Probe  -     fluconazole (DIFLUCAN) 150 MG Tab; Take 1 tablet (150 mg total) by mouth once daily. Take 1 now and repeat in 3 days. for 2 doses  Dispense: 2 tablet; Refill: 0  -     fluconazole (DIFLUCAN) 150 MG Tab; Take 1 tablet (150 mg total) by mouth once daily. Take 1 tablet a week for 6 months to prevent reoccurrence for 24 days  Dispense: 24 tablet; Refill: 0      Discussed at length with patient about her constant yeast infections.  Pt has a IUD and we discuss that this could be the cause of her constant BV and yeast infections.  She should discuss this with her GYN provider and possibly have it removed to  see if her symptoms will get better.  Consider a Nexplanon since she doesn't take pills as directed.     Will call students with results

## 2025-05-14 ENCOUNTER — PATIENT MESSAGE (OUTPATIENT)
Dept: URGENT CARE | Facility: CLINIC | Age: 20
End: 2025-05-14
Payer: COMMERCIAL

## 2025-05-14 ENCOUNTER — RESULTS FOLLOW-UP (OUTPATIENT)
Dept: URGENT CARE | Facility: CLINIC | Age: 20
End: 2025-05-14